# Patient Record
Sex: FEMALE | Race: WHITE | NOT HISPANIC OR LATINO | Employment: UNEMPLOYED | URBAN - METROPOLITAN AREA
[De-identification: names, ages, dates, MRNs, and addresses within clinical notes are randomized per-mention and may not be internally consistent; named-entity substitution may affect disease eponyms.]

---

## 2022-08-12 ENCOUNTER — OFFICE VISIT (OUTPATIENT)
Dept: FAMILY MEDICINE | Facility: CLINIC | Age: 27
End: 2022-08-12
Payer: MEDICAID

## 2022-08-12 VITALS
OXYGEN SATURATION: 99 % | DIASTOLIC BLOOD PRESSURE: 80 MMHG | HEART RATE: 86 BPM | SYSTOLIC BLOOD PRESSURE: 115 MMHG | TEMPERATURE: 97 F | RESPIRATION RATE: 18 BRPM | WEIGHT: 212 LBS | BODY MASS INDEX: 35.32 KG/M2 | HEIGHT: 65 IN

## 2022-08-12 DIAGNOSIS — Z76.89 ENCOUNTER TO ESTABLISH CARE: Primary | ICD-10-CM

## 2022-08-12 DIAGNOSIS — R10.11 RIGHT UPPER QUADRANT ABDOMINAL PAIN: ICD-10-CM

## 2022-08-12 DIAGNOSIS — R19.7 DIARRHEA, UNSPECIFIED TYPE: ICD-10-CM

## 2022-08-12 PROCEDURE — 1159F MED LIST DOCD IN RCRD: CPT | Mod: CPTII,,, | Performed by: REGISTERED NURSE

## 2022-08-12 PROCEDURE — 99202 PR OFFICE/OUTPT VISIT, NEW, LEVL II, 15-29 MIN: ICD-10-PCS | Mod: S$PBB,,, | Performed by: REGISTERED NURSE

## 2022-08-12 PROCEDURE — 99202 OFFICE O/P NEW SF 15 MIN: CPT | Mod: S$PBB,,, | Performed by: REGISTERED NURSE

## 2022-08-12 PROCEDURE — 3008F BODY MASS INDEX DOCD: CPT | Mod: CPTII,,, | Performed by: REGISTERED NURSE

## 2022-08-12 PROCEDURE — 3074F PR MOST RECENT SYSTOLIC BLOOD PRESSURE < 130 MM HG: ICD-10-PCS | Mod: CPTII,,, | Performed by: REGISTERED NURSE

## 2022-08-12 PROCEDURE — 3008F PR BODY MASS INDEX (BMI) DOCUMENTED: ICD-10-PCS | Mod: CPTII,,, | Performed by: REGISTERED NURSE

## 2022-08-12 PROCEDURE — 1159F PR MEDICATION LIST DOCUMENTED IN MEDICAL RECORD: ICD-10-PCS | Mod: CPTII,,, | Performed by: REGISTERED NURSE

## 2022-08-12 PROCEDURE — 3079F DIAST BP 80-89 MM HG: CPT | Mod: CPTII,,, | Performed by: REGISTERED NURSE

## 2022-08-12 PROCEDURE — 3074F SYST BP LT 130 MM HG: CPT | Mod: CPTII,,, | Performed by: REGISTERED NURSE

## 2022-08-12 PROCEDURE — 3079F PR MOST RECENT DIASTOLIC BLOOD PRESSURE 80-89 MM HG: ICD-10-PCS | Mod: CPTII,,, | Performed by: REGISTERED NURSE

## 2022-08-12 PROCEDURE — 99999 PR PBB SHADOW E&M-EST. PATIENT-LVL IV: ICD-10-PCS | Mod: PBBFAC,,, | Performed by: REGISTERED NURSE

## 2022-08-12 PROCEDURE — 99214 OFFICE O/P EST MOD 30 MIN: CPT | Mod: PBBFAC | Performed by: REGISTERED NURSE

## 2022-08-12 PROCEDURE — 99999 PR PBB SHADOW E&M-EST. PATIENT-LVL IV: CPT | Mod: PBBFAC,,, | Performed by: REGISTERED NURSE

## 2022-08-12 RX ORDER — FLUOXETINE 10 MG/1
10 CAPSULE ORAL DAILY
Status: ON HOLD | COMMUNITY
Start: 2022-07-30 | End: 2023-02-15

## 2022-08-12 RX ORDER — LITHIUM CARBONATE 300 MG/1
60 CAPSULE ORAL 2 TIMES DAILY
Status: ON HOLD | COMMUNITY
Start: 2022-07-30 | End: 2023-02-15 | Stop reason: HOSPADM

## 2022-08-12 RX ORDER — BENZTROPINE MESYLATE 1 MG/1
1 TABLET ORAL NIGHTLY
Status: ON HOLD | COMMUNITY
Start: 2022-07-30 | End: 2023-02-15 | Stop reason: HOSPADM

## 2022-08-12 RX ORDER — FLUTICASONE PROPIONATE 50 MCG
1 SPRAY, SUSPENSION (ML) NASAL DAILY
Status: ON HOLD | COMMUNITY
Start: 2022-07-30 | End: 2023-02-15

## 2022-08-12 RX ORDER — LORATADINE 10 MG/1
10 TABLET ORAL DAILY
Status: ON HOLD | COMMUNITY
Start: 2022-07-30 | End: 2023-02-15

## 2022-08-12 RX ORDER — PALIPERIDONE 6 MG/1
6 TABLET, EXTENDED RELEASE ORAL DAILY
Status: ON HOLD | COMMUNITY
Start: 2022-08-11 | End: 2023-02-15

## 2022-08-12 RX ORDER — TRAZODONE HYDROCHLORIDE 100 MG/1
100 TABLET ORAL NIGHTLY
Status: ON HOLD | COMMUNITY
Start: 2022-07-30 | End: 2023-02-15

## 2022-08-12 RX ORDER — FENOFIBRATE 134 MG/1
134 CAPSULE ORAL DAILY
Status: ON HOLD | COMMUNITY
Start: 2022-07-30 | End: 2023-02-15

## 2022-08-12 NOTE — PATIENT INSTRUCTIONS
healthy lifestyle discussed with patient.  Patient instructed to increase physical activity to 30 minutes most days as tolerated.  Medication compliance discussed with patient.  Keep next scheduled mental health appointment at [facility]  Abdominal ultrasound ordered today, will call patient with results.  Patient to return to clinic in 3 months for Pap and breast exam  .  Return to clinic as

## 2022-08-12 NOTE — PROGRESS NOTES
Subjective:       Patient ID: Brit Baird is a 27 y.o. female.    Chief Complaint: Est. care, wellness, abdominal pain    Patient is a 27-year-old female here today to establish care.  Patient has past medical history of schizoaffective disorder and PCOS.  Patient complaining of right upper quadrant pain x2 weeks that radiates to mid abdomen and diarrhea.    Review of Systems   Constitutional: Negative.  Negative for chills, fatigue and fever.   HENT: Negative.    Eyes: Negative.    Respiratory: Negative.  Negative for cough and shortness of breath.    Cardiovascular: Negative.  Negative for chest pain.   Gastrointestinal: Positive for abdominal pain and diarrhea.   Endocrine: Negative.    Genitourinary: Negative.    Musculoskeletal: Negative.    Integumentary:  Negative.   Allergic/Immunologic: Negative.    Neurological: Negative.    Hematological: Negative.    Psychiatric/Behavioral: Negative for self-injury and suicidal ideas.         Objective:      Physical Exam  HENT:      Head: Normocephalic.      Right Ear: Tympanic membrane normal.      Left Ear: Tympanic membrane normal.      Nose: Nose normal.      Mouth/Throat:      Mouth: Mucous membranes are moist.   Eyes:      Pupils: Pupils are equal, round, and reactive to light.   Cardiovascular:      Rate and Rhythm: Normal rate and regular rhythm.      Pulses: Normal pulses.      Heart sounds: Normal heart sounds.   Pulmonary:      Effort: Pulmonary effort is normal.      Breath sounds: Normal breath sounds.   Abdominal:      General: Abdomen is flat. Bowel sounds are normal.      Tenderness: There is abdominal tenderness in the right upper quadrant and epigastric area. There is no guarding or rebound. Negative signs include Coates's sign, Rovsing's sign, McBurney's sign and psoas sign.   Musculoskeletal:         General: Normal range of motion.      Cervical back: Normal range of motion.   Skin:     General: Skin is warm and dry.      Capillary Refill: Capillary  refill takes less than 2 seconds.   Neurological:      General: No focal deficit present.      Mental Status: She is alert and oriented to person, place, and time.   Psychiatric:         Mood and Affect: Mood normal.         Behavior: Behavior normal.         Thought Content: Thought content normal.         Judgment: Judgment normal.         Assessment:       Problem List Items Addressed This Visit    None     Visit Diagnoses     Encounter to establish care    -  Primary    Right upper quadrant abdominal pain        Diarrhea, unspecified type              Plan:     healthy lifestyle discussed with patient.  Patient instructed to increase physical activity to 30 minutes most days as tolerated.  Medication compliance discussed with patient.  Keep next scheduled mental health appointment at [facility]  Abdominal ultrasound ordered today, will call patient with results.  Patient to return to clinic in 3 months for Pap and breast exam  .  Return to clinic as needed

## 2022-09-27 DIAGNOSIS — N92.6 IRREGULAR PERIODS: Primary | ICD-10-CM

## 2022-10-07 ENCOUNTER — HOSPITAL ENCOUNTER (OUTPATIENT)
Dept: RADIOLOGY | Facility: HOSPITAL | Age: 27
Discharge: HOME | End: 2022-10-07
Attending: NURSE PRACTITIONER
Payer: MEDICAID

## 2022-10-07 DIAGNOSIS — N92.6 IRREGULAR PERIODS: ICD-10-CM

## 2022-10-07 PROCEDURE — 76856 US EXAM PELVIC COMPLETE: CPT | Mod: TC

## 2022-10-10 ENCOUNTER — TELEPHONE (OUTPATIENT)
Dept: FAMILY MEDICINE | Facility: CLINIC | Age: 27
End: 2022-10-10
Payer: MEDICAID

## 2022-10-10 NOTE — TELEPHONE ENCOUNTER
----- Message from CHANNING Barragan sent at 10/7/2022 10:43 AM CDT -----  Can you let her know that her ultrasound was normal, thanks.

## 2022-11-18 ENCOUNTER — OFFICE VISIT (OUTPATIENT)
Dept: FAMILY MEDICINE | Facility: CLINIC | Age: 27
End: 2022-11-18
Payer: MEDICAID

## 2022-11-18 VITALS
DIASTOLIC BLOOD PRESSURE: 82 MMHG | HEART RATE: 99 BPM | BODY MASS INDEX: 37.09 KG/M2 | OXYGEN SATURATION: 97 % | RESPIRATION RATE: 16 BRPM | SYSTOLIC BLOOD PRESSURE: 122 MMHG | HEIGHT: 65 IN | WEIGHT: 222.63 LBS

## 2022-11-18 DIAGNOSIS — F31.9 BIPOLAR 1 DISORDER: ICD-10-CM

## 2022-11-18 DIAGNOSIS — Z71.89 ENCOUNTER FOR BREAST SELF EXAMINATION EDUCATION: ICD-10-CM

## 2022-11-18 DIAGNOSIS — Z01.419 ENCOUNTER FOR CERVICAL PAP SMEAR WITH PELVIC EXAM: ICD-10-CM

## 2022-11-18 DIAGNOSIS — N92.6 MENSTRUAL IRREGULARITY: ICD-10-CM

## 2022-11-18 DIAGNOSIS — Z87.42 HISTORY OF PCOS: Primary | ICD-10-CM

## 2022-11-18 DIAGNOSIS — E66.9 OBESITY (BMI 35.0-39.9 WITHOUT COMORBIDITY): ICD-10-CM

## 2022-11-18 PROCEDURE — 99215 PR OFFICE/OUTPT VISIT, EST, LEVL V, 40-54 MIN: ICD-10-PCS | Mod: S$PBB,,, | Performed by: REGISTERED NURSE

## 2022-11-18 PROCEDURE — 3008F BODY MASS INDEX DOCD: CPT | Mod: CPTII,,, | Performed by: REGISTERED NURSE

## 2022-11-18 PROCEDURE — 99999 PR PBB SHADOW E&M-EST. PATIENT-LVL IV: CPT | Mod: PBBFAC,,, | Performed by: REGISTERED NURSE

## 2022-11-18 PROCEDURE — 3079F PR MOST RECENT DIASTOLIC BLOOD PRESSURE 80-89 MM HG: ICD-10-PCS | Mod: CPTII,,, | Performed by: REGISTERED NURSE

## 2022-11-18 PROCEDURE — 3074F SYST BP LT 130 MM HG: CPT | Mod: CPTII,,, | Performed by: REGISTERED NURSE

## 2022-11-18 PROCEDURE — 1159F PR MEDICATION LIST DOCUMENTED IN MEDICAL RECORD: ICD-10-PCS | Mod: CPTII,,, | Performed by: REGISTERED NURSE

## 2022-11-18 PROCEDURE — 99214 OFFICE O/P EST MOD 30 MIN: CPT | Mod: PBBFAC | Performed by: REGISTERED NURSE

## 2022-11-18 PROCEDURE — 3008F PR BODY MASS INDEX (BMI) DOCUMENTED: ICD-10-PCS | Mod: CPTII,,, | Performed by: REGISTERED NURSE

## 2022-11-18 PROCEDURE — 1159F MED LIST DOCD IN RCRD: CPT | Mod: CPTII,,, | Performed by: REGISTERED NURSE

## 2022-11-18 PROCEDURE — 99215 OFFICE O/P EST HI 40 MIN: CPT | Mod: S$PBB,,, | Performed by: REGISTERED NURSE

## 2022-11-18 PROCEDURE — 3079F DIAST BP 80-89 MM HG: CPT | Mod: CPTII,,, | Performed by: REGISTERED NURSE

## 2022-11-18 PROCEDURE — 99999 PR PBB SHADOW E&M-EST. PATIENT-LVL IV: ICD-10-PCS | Mod: PBBFAC,,, | Performed by: REGISTERED NURSE

## 2022-11-18 PROCEDURE — 3074F PR MOST RECENT SYSTOLIC BLOOD PRESSURE < 130 MM HG: ICD-10-PCS | Mod: CPTII,,, | Performed by: REGISTERED NURSE

## 2022-11-18 RX ORDER — NORGESTIMATE AND ETHINYL ESTRADIOL 7DAYSX3 LO
1 KIT ORAL DAILY
Qty: 30 TABLET | Refills: 11 | Status: ON HOLD | OUTPATIENT
Start: 2022-11-18 | End: 2023-02-15

## 2022-11-18 RX ORDER — ERGOCALCIFEROL 1.25 MG/1
CAPSULE ORAL
Status: ON HOLD | COMMUNITY
Start: 2022-09-27 | End: 2023-02-15

## 2022-11-18 NOTE — PROGRESS NOTES
Subjective:       Patient ID: Brit Baird is a 27 y.o. female.    Chief Complaint: Follow-up (3 month f/u (Pelvic Exam)/Had period 1 month ago/Concerned due to lack of periods/ periods not starting on their own/Prescribed medication to start periods)    Patient is here for Pap and breast exam today.  Patient complaining of menstrual irregularity, patient states last menstrual cycle was over 1 month ago, UPT in office was negative.  Patient stated that she was diagnosed with PCOS, and would like to start birth control today.  Review of Systems   Constitutional: Negative.  Negative for chills, fatigue and fever.   HENT: Negative.     Eyes: Negative.    Respiratory: Negative.     Cardiovascular: Negative.    Gastrointestinal: Negative.    Endocrine: Negative.    Genitourinary:  Positive for menstrual irregularity. Negative for pelvic pain, vaginal discharge and vaginal pain.   Musculoskeletal: Negative.    Integumentary:  Negative for breast mass and breast tenderness. Negative.   Allergic/Immunologic: Negative.    Neurological: Negative.    Hematological: Negative.    Psychiatric/Behavioral: Negative.          Bipolar disorder   Breast: Negative for mass and tenderness      Objective:      Physical Exam  Exam conducted with a chaperone present.   Constitutional:       Appearance: Normal appearance. She is obese.   Cardiovascular:      Rate and Rhythm: Normal rate and regular rhythm.      Pulses: Normal pulses.      Heart sounds: Normal heart sounds.   Chest:   Breasts:     Breasts are symmetrical.      Right: Normal.      Left: Normal.      Comments: Monthly breast self exams discussed with patient.  Genitourinary:     General: Normal vulva.      Vagina: Normal.      Cervix: Normal.      Uterus: Normal.       Adnexa: Right adnexa normal and left adnexa normal.      Comments: Pap collected, pt tolerated well.   Lymphadenopathy:      Upper Body:      Right upper body: No supraclavicular, axillary or pectoral adenopathy.       Left upper body: No supraclavicular, axillary or pectoral adenopathy.   Neurological:      Mental Status: She is alert.       Assessment:       Problem List Items Addressed This Visit          Psychiatric    Bipolar 1 disorder       Renal/    History of PCOS - Primary    Encounter for breast self examination education       Endocrine    Obesity (BMI 35.0-39.9 without comorbidity)     Other Visit Diagnoses       Encounter for cervical Pap smear with pelvic exam        Relevant Orders    Liquid-based pap smear, screening    HPV High Risk Genotypes, PCR    Liquid-Based Pap Smear, Screening Screening    Menstrual irregularity        Relevant Medications    norgestimate-ethinyl estradioL (ORTHO TRI-CYCLEN LO) 0.18/0.215/0.25 mg-25 mcg tablet    Other Relevant Orders    POCT Urine Pregnancy        I spent a total of 60 minutes on the day of the visit.This includes face to face time and non-face to face time preparing to see the patient (eg, review of tests), obtaining and/or reviewing separately obtained history, documenting clinical information in the electronic or other health record, independently interpreting results and communicating results to the patient/family/caregiver, or care coordinator.    Plan:   Pap smear/pelvic exam-Pap collected today, will call patient with results.    Menstrual irregularity-ortho Tri-Cyclen sent to pharmacy on file with instructions, patient instructed to start the pill today.  Use backup method x7 days at the start of pill if sexually active.     Breast self-exam-patient educated on proper technique of breast self-exam, instructed to examine breasts at least 7 days after menstrual cycle monthly, report any abnormalities    Obesity-patient instructed to increase physical activity to 30 minutes most days as tolerated, benefits of weight loss discussed with patient.  Low calorie diet discussed.    Return to clinic in 3 months for follow-up exam

## 2022-11-29 LAB
INSULIN SERPL-ACNC: NORMAL U[IU]/ML
LAB AP BETHESDA CATEGORY: NORMAL
LAB AP CLINICAL FINDINGS: NORMAL
LAB AP CONTRACEPTIVES: NORMAL
LAB AP LMP DATE: NORMAL
LAB AP OCHS PAP SPECIMEN ADEQUACY: NORMAL
LAB AP OHS PAP INTERPRETATION: NORMAL
LAB AP PAP DISCLAIMER COMMENTS: NORMAL
LAB AP PAP ESTROGEN REPLACEMENT THERAPY: NORMAL
LAB AP PAP PMP: NORMAL
LAB AP PAP PREVIOUS BX: NORMAL
LAB AP PAP PRIOR TREATMENT: NORMAL

## 2023-02-09 ENCOUNTER — HOSPITAL ENCOUNTER (EMERGENCY)
Facility: HOSPITAL | Age: 28
Discharge: PSYCHIATRIC OR SUBSTANCE USE FACILITY | End: 2023-02-10
Attending: GENERAL ACUTE CARE HOSPITAL
Payer: MEDICAID

## 2023-02-09 DIAGNOSIS — R31.9 URINARY TRACT INFECTION WITH HEMATURIA, SITE UNSPECIFIED: ICD-10-CM

## 2023-02-09 DIAGNOSIS — Z00.8 MEDICAL CLEARANCE FOR PSYCHIATRIC ADMISSION: ICD-10-CM

## 2023-02-09 DIAGNOSIS — N39.0 URINARY TRACT INFECTION WITH HEMATURIA, SITE UNSPECIFIED: ICD-10-CM

## 2023-02-09 DIAGNOSIS — F11.10 FENTANYL USE DISORDER, MILD, ABUSE: ICD-10-CM

## 2023-02-09 DIAGNOSIS — F20.0 PARANOID SCHIZOPHRENIA: Primary | ICD-10-CM

## 2023-02-09 LAB
ALBUMIN SERPL-MCNC: 4.3 G/DL (ref 3.5–5)
ALBUMIN/GLOB SERPL: 1.5 RATIO (ref 1.1–2)
ALP SERPL-CCNC: 37 UNIT/L (ref 40–150)
ALT SERPL-CCNC: 26 UNIT/L (ref 0–55)
AMPHET UR QL SCN: NEGATIVE
APAP SERPL-MCNC: <17.4 UG/ML (ref 17.4–30)
APPEARANCE UR: ABNORMAL
AST SERPL-CCNC: 19 UNIT/L (ref 5–34)
B-HCG SERPL QL: NEGATIVE
BACTERIA #/AREA URNS AUTO: ABNORMAL /HPF
BARBITURATE SCN PRESENT UR: NEGATIVE
BASOPHILS # BLD AUTO: 0.05 X10(3)/MCL (ref 0–0.2)
BASOPHILS NFR BLD AUTO: 0.8 %
BENZODIAZ UR QL SCN: NEGATIVE
BILIRUB UR QL STRIP.AUTO: NEGATIVE MG/DL
BILIRUBIN DIRECT+TOT PNL SERPL-MCNC: 0.2 MG/DL
BUN SERPL-MCNC: 12 MG/DL (ref 7–18.7)
CALCIUM SERPL-MCNC: 9 MG/DL (ref 8.4–10.2)
CANNABINOIDS UR QL SCN: NEGATIVE
CHLORIDE SERPL-SCNC: 110 MMOL/L (ref 98–107)
CO2 SERPL-SCNC: 22 MMOL/L (ref 22–29)
COCAINE UR QL SCN: NEGATIVE
COLOR UR AUTO: ABNORMAL
CREAT SERPL-MCNC: 0.87 MG/DL (ref 0.55–1.02)
EOSINOPHIL # BLD AUTO: 0.09 X10(3)/MCL (ref 0–0.9)
EOSINOPHIL NFR BLD AUTO: 1.5 %
ERYTHROCYTE [DISTWIDTH] IN BLOOD BY AUTOMATED COUNT: 11.9 % (ref 11.5–17)
ETHANOL SERPL-MCNC: <10 MG/DL
FENTANYL UR QL SCN: POSITIVE
FLUAV AG UPPER RESP QL IA.RAPID: NOT DETECTED
FLUBV AG UPPER RESP QL IA.RAPID: NOT DETECTED
GFR SERPLBLD CREATININE-BSD FMLA CKD-EPI: >60 MLS/MIN/1.73/M2
GLOBULIN SER-MCNC: 2.9 GM/DL (ref 2.4–3.5)
GLUCOSE SERPL-MCNC: 105 MG/DL (ref 74–100)
GLUCOSE UR QL STRIP.AUTO: NEGATIVE MG/DL
HCT VFR BLD AUTO: 42 % (ref 37–47)
HGB BLD-MCNC: 14.2 GM/DL (ref 12–16)
IMM GRANULOCYTES # BLD AUTO: 0.02 X10(3)/MCL (ref 0–0.04)
IMM GRANULOCYTES NFR BLD AUTO: 0.3 %
KETONES UR QL STRIP.AUTO: NEGATIVE MG/DL
LEUKOCYTE ESTERASE UR QL STRIP.AUTO: ABNORMAL UNIT/L
LYMPHOCYTES # BLD AUTO: 2 X10(3)/MCL (ref 0.6–4.6)
LYMPHOCYTES NFR BLD AUTO: 32.3 %
MCH RBC QN AUTO: 29.7 PG
MCHC RBC AUTO-ENTMCNC: 33.8 MG/DL (ref 33–36)
MCV RBC AUTO: 87.9 FL (ref 80–94)
MDMA UR QL SCN: NEGATIVE
MONOCYTES # BLD AUTO: 0.43 X10(3)/MCL (ref 0.1–1.3)
MONOCYTES NFR BLD AUTO: 6.9 %
NEUTROPHILS # BLD AUTO: 3.61 X10(3)/MCL (ref 2.1–9.2)
NEUTROPHILS NFR BLD AUTO: 58.2 %
NITRITE UR QL STRIP.AUTO: NEGATIVE
OPIATES UR QL SCN: NEGATIVE
PCP UR QL: NEGATIVE
PH UR STRIP.AUTO: 7.5 [PH]
PH UR: 7.5 [PH] (ref 3–11)
PLATELET # BLD AUTO: 216 X10(3)/MCL (ref 130–400)
PMV BLD AUTO: 9.7 FL (ref 7.4–10.4)
POTASSIUM SERPL-SCNC: 3.9 MMOL/L (ref 3.5–5.1)
PROT SERPL-MCNC: 7.2 GM/DL (ref 6.4–8.3)
PROT UR QL STRIP.AUTO: ABNORMAL MG/DL
RBC # BLD AUTO: 4.78 X10(6)/MCL (ref 4.2–5.4)
RBC #/AREA URNS AUTO: ABNORMAL /HPF
RBC UR QL AUTO: NEGATIVE UNIT/L
SALICYLATES SERPL-MCNC: <5 MG/DL
SARS-COV-2 RNA RESP QL NAA+PROBE: NOT DETECTED
SODIUM SERPL-SCNC: 140 MMOL/L (ref 136–145)
SP GR UR STRIP.AUTO: 1.02
SPECIFIC GRAVITY, URINE AUTO (.000) (OHS): 1.02 (ref 1–1.03)
SQUAMOUS #/AREA URNS AUTO: ABNORMAL /HPF
TSH SERPL-ACNC: 1.22 UIU/ML (ref 0.35–4.94)
UROBILINOGEN UR STRIP-ACNC: 0.2 MG/DL
WBC # SPEC AUTO: 6.2 X10(3)/MCL (ref 4.5–11.5)
WBC #/AREA URNS AUTO: ABNORMAL /HPF

## 2023-02-09 PROCEDURE — 80143 DRUG ASSAY ACETAMINOPHEN: CPT | Performed by: NURSE PRACTITIONER

## 2023-02-09 PROCEDURE — 99285 EMERGENCY DEPT VISIT HI MDM: CPT

## 2023-02-09 PROCEDURE — 85025 COMPLETE CBC W/AUTO DIFF WBC: CPT | Performed by: NURSE PRACTITIONER

## 2023-02-09 PROCEDURE — 84443 ASSAY THYROID STIM HORMONE: CPT | Performed by: NURSE PRACTITIONER

## 2023-02-09 PROCEDURE — 80307 DRUG TEST PRSMV CHEM ANLYZR: CPT | Performed by: NURSE PRACTITIONER

## 2023-02-09 PROCEDURE — 81001 URINALYSIS AUTO W/SCOPE: CPT | Performed by: NURSE PRACTITIONER

## 2023-02-09 PROCEDURE — 80179 DRUG ASSAY SALICYLATE: CPT | Performed by: NURSE PRACTITIONER

## 2023-02-09 PROCEDURE — 25000003 PHARM REV CODE 250: Performed by: GENERAL ACUTE CARE HOSPITAL

## 2023-02-09 PROCEDURE — 82077 ASSAY SPEC XCP UR&BREATH IA: CPT | Performed by: NURSE PRACTITIONER

## 2023-02-09 PROCEDURE — 80053 COMPREHEN METABOLIC PANEL: CPT | Performed by: NURSE PRACTITIONER

## 2023-02-09 PROCEDURE — 81025 URINE PREGNANCY TEST: CPT | Performed by: GENERAL ACUTE CARE HOSPITAL

## 2023-02-09 PROCEDURE — 0240U COVID/FLU A&B PCR: CPT | Performed by: GENERAL ACUTE CARE HOSPITAL

## 2023-02-09 RX ORDER — PALIPERIDONE PALMITATE 546 MG/1.75ML
INJECTION, SUSPENSION, EXTENDED RELEASE INTRAMUSCULAR
Status: ON HOLD | COMMUNITY
Start: 2022-12-14 | End: 2023-02-15

## 2023-02-09 RX ORDER — CEPHALEXIN 500 MG/1
500 TABLET ORAL 3 TIMES DAILY
Qty: 21 TABLET | Refills: 0 | Status: ON HOLD | OUTPATIENT
Start: 2023-02-09 | End: 2023-02-15 | Stop reason: HOSPADM

## 2023-02-09 RX ORDER — CEPHALEXIN 250 MG/1
500 CAPSULE ORAL
Status: COMPLETED | OUTPATIENT
Start: 2023-02-09 | End: 2023-02-09

## 2023-02-09 RX ORDER — CALCIUM CARBONATE 200(500)MG
500 TABLET,CHEWABLE ORAL
Status: COMPLETED | OUTPATIENT
Start: 2023-02-09 | End: 2023-02-09

## 2023-02-09 RX ORDER — LITHIUM CARBONATE 150 MG/1
150 CAPSULE ORAL 2 TIMES DAILY
Status: ON HOLD | COMMUNITY
Start: 2023-02-06 | End: 2023-02-15 | Stop reason: HOSPADM

## 2023-02-09 RX ADMIN — CEPHALEXIN 500 MG: 250 CAPSULE ORAL at 08:02

## 2023-02-09 RX ADMIN — CALCIUM CARBONATE (ANTACID) CHEW TAB 500 MG 500 MG: 500 CHEW TAB at 09:02

## 2023-02-10 ENCOUNTER — HOSPITAL ENCOUNTER (INPATIENT)
Facility: HOSPITAL | Age: 28
LOS: 6 days | Discharge: HOME | DRG: 885 | End: 2023-02-16
Attending: PSYCHIATRY & NEUROLOGY | Admitting: PSYCHIATRY & NEUROLOGY
Payer: MEDICAID

## 2023-02-10 VITALS
WEIGHT: 205 LBS | TEMPERATURE: 98 F | SYSTOLIC BLOOD PRESSURE: 129 MMHG | HEART RATE: 87 BPM | RESPIRATION RATE: 18 BRPM | OXYGEN SATURATION: 100 % | HEIGHT: 66 IN | BODY MASS INDEX: 32.95 KG/M2 | DIASTOLIC BLOOD PRESSURE: 72 MMHG

## 2023-02-10 DIAGNOSIS — F29 PSYCHOSIS: ICD-10-CM

## 2023-02-10 LAB
ALBUMIN SERPL-MCNC: 4.1 G/DL (ref 3.5–5)
ALBUMIN/GLOB SERPL: 1.6 RATIO (ref 1.1–2)
ALP SERPL-CCNC: 28 UNIT/L (ref 40–150)
ALT SERPL-CCNC: 24 UNIT/L (ref 0–55)
AST SERPL-CCNC: 19 UNIT/L (ref 5–34)
BILIRUBIN DIRECT+TOT PNL SERPL-MCNC: 0.2 MG/DL
BUN SERPL-MCNC: 10.2 MG/DL (ref 7–18.7)
CALCIUM SERPL-MCNC: 9.6 MG/DL (ref 8.4–10.2)
CHLORIDE SERPL-SCNC: 110 MMOL/L (ref 98–107)
CHOLEST SERPL-MCNC: 147 MG/DL
CHOLEST/HDLC SERPL: 5 {RATIO} (ref 0–5)
CO2 SERPL-SCNC: 23 MMOL/L (ref 22–29)
CREAT SERPL-MCNC: 0.76 MG/DL (ref 0.55–1.02)
EST. AVERAGE GLUCOSE BLD GHB EST-MCNC: 96.8 MG/DL
GFR SERPLBLD CREATININE-BSD FMLA CKD-EPI: >60 MLS/MIN/1.73/M2
GLOBULIN SER-MCNC: 2.6 GM/DL (ref 2.4–3.5)
GLUCOSE SERPL-MCNC: 111 MG/DL (ref 74–100)
HBA1C MFR BLD: 5 %
HDLC SERPL-MCNC: 29 MG/DL (ref 35–60)
LDLC SERPL CALC-MCNC: 59 MG/DL (ref 50–140)
POTASSIUM SERPL-SCNC: 4 MMOL/L (ref 3.5–5.1)
PROT SERPL-MCNC: 6.7 GM/DL (ref 6.4–8.3)
SODIUM SERPL-SCNC: 142 MMOL/L (ref 136–145)
T PALLIDUM AB SER QL: NONREACTIVE
TRIGL SERPL-MCNC: 293 MG/DL (ref 37–140)
TSH SERPL-ACNC: 2.36 UIU/ML (ref 0.35–4.94)
VLDLC SERPL CALC-MCNC: 59 MG/DL

## 2023-02-10 PROCEDURE — 84443 ASSAY THYROID STIM HORMONE: CPT | Performed by: PSYCHIATRY & NEUROLOGY

## 2023-02-10 PROCEDURE — 12400001 HC PSYCH SEMI-PRIVATE ROOM

## 2023-02-10 PROCEDURE — 80053 COMPREHEN METABOLIC PANEL: CPT | Performed by: PSYCHIATRY & NEUROLOGY

## 2023-02-10 PROCEDURE — 80061 LIPID PANEL: CPT | Performed by: PSYCHIATRY & NEUROLOGY

## 2023-02-10 PROCEDURE — 25000003 PHARM REV CODE 250: Performed by: PSYCHIATRY & NEUROLOGY

## 2023-02-10 PROCEDURE — 25000003 PHARM REV CODE 250: Performed by: GENERAL ACUTE CARE HOSPITAL

## 2023-02-10 PROCEDURE — 86780 TREPONEMA PALLIDUM: CPT | Performed by: PSYCHIATRY & NEUROLOGY

## 2023-02-10 PROCEDURE — 83036 HEMOGLOBIN GLYCOSYLATED A1C: CPT | Performed by: PSYCHIATRY & NEUROLOGY

## 2023-02-10 PROCEDURE — 80178 ASSAY OF LITHIUM: CPT

## 2023-02-10 PROCEDURE — 25000003 PHARM REV CODE 250

## 2023-02-10 RX ORDER — LORAZEPAM 2 MG/ML
2 INJECTION INTRAMUSCULAR EVERY 4 HOURS PRN
Status: DISCONTINUED | OUTPATIENT
Start: 2023-02-10 | End: 2023-02-16 | Stop reason: HOSPADM

## 2023-02-10 RX ORDER — HYDROXYZINE HYDROCHLORIDE 50 MG/1
50 TABLET, FILM COATED ORAL EVERY 4 HOURS PRN
Status: DISCONTINUED | OUTPATIENT
Start: 2023-02-10 | End: 2023-02-16 | Stop reason: HOSPADM

## 2023-02-10 RX ORDER — MAG HYDROX/ALUMINUM HYD/SIMETH 200-200-20
30 SUSPENSION, ORAL (FINAL DOSE FORM) ORAL EVERY 6 HOURS PRN
Status: DISCONTINUED | OUTPATIENT
Start: 2023-02-10 | End: 2023-02-16 | Stop reason: HOSPADM

## 2023-02-10 RX ORDER — CEPHALEXIN 500 MG/1
500 CAPSULE ORAL EVERY 8 HOURS
Status: DISCONTINUED | OUTPATIENT
Start: 2023-02-10 | End: 2023-02-16 | Stop reason: HOSPADM

## 2023-02-10 RX ORDER — TRAZODONE HYDROCHLORIDE 100 MG/1
100 TABLET ORAL NIGHTLY PRN
Status: DISCONTINUED | OUTPATIENT
Start: 2023-02-10 | End: 2023-02-13

## 2023-02-10 RX ORDER — ONDANSETRON 4 MG/1
4 TABLET, ORALLY DISINTEGRATING ORAL EVERY 8 HOURS PRN
Status: DISCONTINUED | OUTPATIENT
Start: 2023-02-10 | End: 2023-02-16 | Stop reason: HOSPADM

## 2023-02-10 RX ORDER — BENZTROPINE MESYLATE 1 MG/1
1 TABLET ORAL DAILY
Status: DISCONTINUED | OUTPATIENT
Start: 2023-02-10 | End: 2023-02-16 | Stop reason: HOSPADM

## 2023-02-10 RX ORDER — ACETAMINOPHEN 325 MG/1
650 TABLET ORAL EVERY 6 HOURS PRN
Status: DISCONTINUED | OUTPATIENT
Start: 2023-02-10 | End: 2023-02-16 | Stop reason: HOSPADM

## 2023-02-10 RX ORDER — IBUPROFEN 200 MG
1 TABLET ORAL DAILY
Status: DISCONTINUED | OUTPATIENT
Start: 2023-02-10 | End: 2023-02-14

## 2023-02-10 RX ORDER — DIPHENHYDRAMINE HYDROCHLORIDE 50 MG/ML
50 INJECTION INTRAMUSCULAR; INTRAVENOUS EVERY 4 HOURS PRN
Status: DISCONTINUED | OUTPATIENT
Start: 2023-02-10 | End: 2023-02-16 | Stop reason: HOSPADM

## 2023-02-10 RX ORDER — PROMETHAZINE HYDROCHLORIDE 25 MG/1
25 TABLET ORAL EVERY 6 HOURS PRN
Status: DISCONTINUED | OUTPATIENT
Start: 2023-02-10 | End: 2023-02-16 | Stop reason: HOSPADM

## 2023-02-10 RX ORDER — LITHIUM CARBONATE 150 MG/1
150 CAPSULE ORAL 2 TIMES DAILY
Status: DISCONTINUED | OUTPATIENT
Start: 2023-02-10 | End: 2023-02-13

## 2023-02-10 RX ORDER — HALOPERIDOL 5 MG/ML
10 INJECTION INTRAMUSCULAR EVERY 4 HOURS PRN
Status: DISCONTINUED | OUTPATIENT
Start: 2023-02-10 | End: 2023-02-16 | Stop reason: HOSPADM

## 2023-02-10 RX ORDER — ADHESIVE BANDAGE
30 BANDAGE TOPICAL DAILY PRN
Status: DISCONTINUED | OUTPATIENT
Start: 2023-02-10 | End: 2023-02-16 | Stop reason: HOSPADM

## 2023-02-10 RX ADMIN — LITHIUM CARBONATE 150 MG: 150 CAPSULE, GELATIN COATED ORAL at 08:02

## 2023-02-10 RX ADMIN — BENZTROPINE MESYLATE 1 MG: 1 TABLET ORAL at 12:02

## 2023-02-10 RX ADMIN — CEPHALEXIN 500 MG: 500 CAPSULE ORAL at 09:02

## 2023-02-10 RX ADMIN — LITHIUM CARBONATE 150 MG: 150 CAPSULE, GELATIN COATED ORAL at 12:02

## 2023-02-10 RX ADMIN — CEPHALEXIN 500 MG: 500 CAPSULE ORAL at 02:02

## 2023-02-10 RX ADMIN — TRAZODONE HYDROCHLORIDE 100 MG: 100 TABLET ORAL at 09:02

## 2023-02-10 NOTE — PLAN OF CARE
Psychosocial Assessment     Pt is a 27 y.o. YO  female admitted due to harjinder. Pt UDS was Positive for fentanyl.  Pt ETOH < 10. Pt denies  SI, HI, and AVH at this time. Pt last inpatient  was a hear ago . Pt presents Cooperative with CM staff 1:1. Pt originally from [place], [place]  . Pt has  0 dependents. Pt  is Single .  Pt completed High school. Pt denies  service.  Pt denies financial issues. Pt disabled.  Pt works at  [employer]. Pt denies legal issues. Pt states they do  receive comfort from spiritual practices. Pt emergency contact is Mother. Sabrina  Their phone number is  [phone] . Pt discharge plan at this time is home. 104 1/2 [facility] NIKKI; Maria Fernanda Hamm    02/10/23 1206   Initial Information   Source of Information patient   Reason for Admission harjinder   Patient Aware of Diagnosis yes   Arrived From emergency department   Current or Previous  Service none   Spiritual Beliefs   Spiritual, Cultural Beliefs, Ep[employer] Practices, Values that Affect Care yes   Substance Use/Withdrawal   Substance Use Current, used prior to admission;Denies use   Additional Tobacco Use   How many cigarettes do you typically have per day? 0   AUDIT-C (Alcohol Use Disorders ID Test)   Alcohol Use In Past Year 0-->never   Alcohol Amount Per Day In Past Year 0-->none   More Than 6 Drinks On One Occasion In Past Year 0-->never   Total Audit C Score 0   Transition Planning   Patient/Family Anticipates Transition to home

## 2023-02-10 NOTE — ED PROVIDER NOTES
Encounter Date: 2/9/2023       History     Chief Complaint   Patient presents with    Psychiatric Evaluation     Patient states she got in a argument with her father a few days ago and started to have anger problems. Patient states she is on a shot for schizoaffective disorder. Patient denies HI/SI at this time       Patient was brought emergency room by mother with chief complaints on the anger bipolar mood, auditory hallucinations, patient has history of schizoaffective disorder, last inpatient psychiatrist evaluation was 1 ago, patient was recently started on monthly injections of Invega, December 16, 2022 patient had Invega injection which supposed to last for 3 months, 10 days ago mother noted patient to be very angry, through his cell phone in the mother, did not sleep for 3 days    Review of patient's allergies indicates:   Allergen Reactions    Ibuprofen      Past Medical History:   Diagnosis Date    Schizo affective schizophrenia      No past surgical history on file.  Family History   Problem Relation Age of Onset    Heart disease Father      Social History     Tobacco Use    Smoking status: Never    Smokeless tobacco: Never   Substance Use Topics    Alcohol use: Never    Drug use: Never     Review of Systems   Psychiatric/Behavioral:  Positive for agitation, behavioral problems, hallucinations and sleep disturbance.    All other systems reviewed and are negative.    Physical Exam     Initial Vitals [02/09/23 1909]   BP Pulse Resp Temp SpO2   (!) 147/90 89 20 98.1 °F (36.7 °C) 98 %      MAP       --         Physical Exam    Nursing note and vitals reviewed.  Constitutional: She appears well-developed and well-nourished.   HENT:   Right Ear: External ear normal.   Left Ear: External ear normal.   Eyes: Conjunctivae are normal. Pupils are equal, round, and reactive to light.   Neck: Neck supple.   Normal range of motion.  Cardiovascular:  Normal rate and regular rhythm.           Pulmonary/Chest:  Breath sounds normal.   Abdominal: Abdomen is soft. Bowel sounds are normal.   Musculoskeletal:         General: Normal range of motion.      Cervical back: Normal range of motion and neck supple.     Neurological: She is alert and oriented to person, place, and time. She has normal reflexes. GCS score is 15. GCS eye subscore is 4. GCS verbal subscore is 5. GCS motor subscore is 6.   Skin: Skin is warm. Capillary refill takes 2 to 3 seconds.   Psychiatric: Her mood appears anxious. She is hyperactive. Thought content is paranoid. Cognition and memory are normal.       ED Course   Procedures  Labs Reviewed   COMPREHENSIVE METABOLIC PANEL - Abnormal; Notable for the following components:       Result Value    Chloride 110 (*)     Glucose Level 105 (*)     Alkaline Phosphatase 37 (*)     All other components within normal limits   URINALYSIS, REFLEX TO URINE CULTURE - Abnormal; Notable for the following components:    Appearance, UA Cloudy (*)     Protein, UA Trace (*)     Leukocyte Esterase, UA Small (*)     All other components within normal limits   DRUG SCREEN, URINE (BEAKER) - Abnormal; Notable for the following components:    Fentanyl, Urine Positive (*)     All other components within normal limits    Narrative:     Cut off concentrations:    Amphetamines - 1000 ng/ml  Barbiturates - 200 ng/ml  Benzodiazepine - 200 ng/ml  Cannabinoids (THC) - 50 ng/ml  Cocaine - 300 ng/ml  Fentanyl - 1.0 ng/ml  MDMA - 500 ng/ml  Opiates - 300 ng/ml   Phencyclidine (PCP) - 25 ng/ml    Specimen submitted for drug analysis and tested for pH and specific gravity in order to evaluate sample integrity. Suspect tampering if specific gravity is <1.003 and/or pH is not within the range of 4.5 - 8.0  False negatives may result form substances such as bleach added to urine.  False positives may result for the presence of a substance with similar chemical structure to the drug or its metabolite.    This test provides only a PRELIMINARY  analytical test result. A more specific alternate chemical method must be used in order to obtain a confirmed analytical result. Gas chromatography/mass spectrometry (GC/MS) is the preferred confirmatory method. Other chemical confirmation methods are available. Clinical consideration and professional judgement should be applied to any drug of abuse test result, particularly when preliminary positive results are used.    Positive results will be confirmed only at the physicians request. Unconfirmed screening results are to be used only for medical purposes (treatment).        ACETAMINOPHEN LEVEL - Abnormal; Notable for the following components:    Acetaminophen Level <17.4 (*)     All other components within normal limits   URINALYSIS, MICROSCOPIC - Abnormal; Notable for the following components:    Bacteria, UA Few (*)     WBC, UA 6-10 (*)     Squamous Epithelial Cells, UA Few (*)     All other components within normal limits   TSH - Normal   ALCOHOL,MEDICAL (ETHANOL) - Normal   COVID/FLU A&B PCR - Normal    Narrative:     The Xpert Xpress SARS-CoV-2/FLU/RSV plus is a rapid, multiplexed real-time PCR test intended for the simultaneous qualitative detection and differentiation of SARS-CoV-2, Influenza A, Influenza B, and respiratory syncytial virus (RSV) viral RNA in either nasopharyngeal swab or nasal swab specimens.         CBC W/ AUTO DIFFERENTIAL    Narrative:     The following orders were created for panel order CBC auto differential.  Procedure                               Abnormality         Status                     ---------                               -----------         ------                     CBC with Differential[[id number]]                            Final result                 Please view results for these tests on the individual orders.   SALICYLATE LEVEL   CBC WITH DIFFERENTIAL   HCG QUALITATIVE URINE          Imaging Results    None          Medications   cephALEXin capsule 500 mg (500 mg  Oral Given 2/9/23 2015)     Medical Decision Making:   History:   I obtained history from: someone other than patient.       <> Summary of History: Patient's mother reports that patient through his cellphone and mother  Initial Assessment:   Patient was brought emergency room by mother with chief complaints on the anger bipolar mood, auditory hallucinations, patient has history of schizoaffective disorder, last inpatient psychiatrist evaluation was 1 ago, patient was recently started on monthly injections of Invega, December 16, 2022 patient had Invega injection which supposed to last for 3 months, 10 days ago mother noted patient to be very angry, through his cell phone in the mother, did not sleep for 3 days, physical exam revealed hyperactive female  Differential Diagnosis:   Paranoid Schizophrenia, drug abuse  Clinical Tests:   Lab Tests: Ordered and Reviewed  The following lab test(s) were unremarkable: CBC, CMP, Lipase and Urinalysis           ED Course as of 02/10/23 0201   Thu Feb 09, 2023 1926 Patient was PECs and 1925 [IP]   2031 Patient is medically cleared [IP]   2150 Patient was accepted by Dr. 

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
## 2023-02-10 NOTE — H&P
[facility]  History & Physical    Subjective:      Chief Complaint/Reason for Admission: admitted from ER with possible hallucinations     Brit Baird is a 27 y.o. female. Sent on PEC with possible hallucinations and definite harjinder    Past Medical History:   Diagnosis Date    Schizo affective schizophrenia      No past surgical history on file.  Family History   Problem Relation Age of Onset    Heart disease Father      Social History     Tobacco Use    Smoking status: Never    Smokeless tobacco: Never   Substance Use Topics    Alcohol use: Never    Drug use: Never       PTA Medications   Medication Sig    benztropine (COGENTIN) 1 MG tablet Take 1 mg by mouth every evening.    cephalexin (KEFTAB) 500 mg tablet Take 1 tablet (500 mg total) by mouth 3 (three) times daily. for 7 days    ergocalciferol (ERGOCALCIFEROL) 50,000 unit Cap TAKE 1 CAPSULE BY MOUTH ONCE A WEEK FOR VITAMIN D    fenofibrate micronized (LOFIBRA) 134 MG Cap Take 134 mg by mouth once daily. Take one capsule daily with meals    FLUoxetine 10 MG capsule Take 10 mg by mouth once daily.    fluticasone propionate (FLONASE) 50 mcg/actuation nasal spray 1 spray by Each Nostril route Daily.    INVEGA TRINZA 546 mg/1.75 mL Syrg SMARTSI.75 Milliliter(s) IM Every 3 Months    lithium (ESKALITH) 300 MG capsule Take 60 mg by mouth 2 (two) times a day.    lithium carbonate 150 MG capsule Take 150 mg by mouth 2 (two) times daily.    loratadine (CLARITIN) 10 mg tablet Take 10 mg by mouth once daily.    norgestimate-ethinyl estradioL (ORTHO TRI-CYCLEN LO) 0.18/0.215/0.25 mg-25 mcg tablet Take 1 tablet by mouth once daily.    paliperidone (INVEGA) 6 MG TR24 Take 6 mg by mouth once daily.    traZODone (DESYREL) 100 MG tablet Take 100 mg by mouth every evening.     Review of patient's allergies indicates:   Allergen Reactions    Ibuprofen         Review of Systems   Constitutional: Negative.    HENT: Negative.     Eyes:  Negative.    Respiratory: Negative.     Cardiovascular: Negative.    Gastrointestinal: Negative.    Genitourinary: Negative.    Musculoskeletal: Negative.    Skin: Negative.    Neurological: Negative.    Endo/Heme/Allergies: Negative.    Psychiatric/Behavioral:  Positive for hallucinations. Negative for depression, substance abuse and suicidal ideas.      Objective:      Vital Signs (Most Recent)  Temp: 98.6 °F (37 °C) (02/10/23 0601)  Pulse: 91 (02/10/23 0601)  Resp: 20 (02/10/23 0601)  BP: 139/84 (02/10/23 0601)    Vital Signs Range (Last 24H):  Temp:  [98.1 °F (36.7 °C)-98.6 °F (37 °C)]   Pulse:  [87-93]   Resp:  [18-20]   BP: (129-147)/(72-90)   SpO2:  [97 %-100 %]     Physical Exam  HENT:      Head: Normocephalic.      Right Ear: Tympanic membrane normal.      Left Ear: Tympanic membrane normal.      Nose: Nose normal.      Mouth/Throat:      Mouth: Mucous membranes are moist.   Eyes:      Extraocular Movements: Extraocular movements intact.      Pupils: Pupils are equal, round, and reactive to light.   Cardiovascular:      Rate and Rhythm: Normal rate and regular rhythm.   Pulmonary:      Effort: Pulmonary effort is normal.   Abdominal:      General: Abdomen is flat.   Musculoskeletal:         General: Normal range of motion.   Skin:     General: Skin is warm.   Neurological:      General: No focal deficit present.      Mental Status: She is alert and oriented to person, place, and time.      Comments: Vision normal   Hearing normal   EOM intact   Face muscles normal  Facial sensation normal   Shrugs shoulders  Tongue midline          Data Review:    Recent Results (from the past 48 hour(s))   Comprehensive metabolic panel    Collection Time: 02/09/23  7:29 PM   Result Value Ref Range    Sodium Level 140 136 - 145 mmol/L    Potassium Level 3.9 3.5 - 5.1 mmol/L    Chloride 110 (H) 98 - 107 mmol/L    Carbon Dioxide 22 22 - 29 mmol/L    Glucose Level 105 (H) 74 - 100 mg/dL    Blood Urea Nitrogen 12.0 7.0 - 18.7  mg/dL    Creatinine 0.87 0.55 - 1.02 mg/dL    Calcium Level Total 9.0 8.4 - 10.2 mg/dL    Protein Total 7.2 6.4 - 8.3 gm/dL    Albumin Level 4.3 3.5 - 5.0 g/dL    Globulin 2.9 2.4 - 3.5 gm/dL    Albumin/Globulin Ratio 1.5 1.1 - 2.0 ratio    Bilirubin Total 0.2 <=1.5 mg/dL    Alkaline Phosphatase 37 (L) 40 - 150 unit/L    Alanine Aminotransferase 26 0 - 55 unit/L    Aspartate Aminotransferase 19 5 - 34 unit/L    eGFR >60 mls/min/1.73/m2   TSH    Collection Time: 02/09/23  7:29 PM   Result Value Ref Range    Thyroid Stimulating Hormone 1.222 0.350 - 4.940 uIU/mL   Ethanol    Collection Time: 02/09/23  7:29 PM   Result Value Ref Range    Ethanol Level <10.0 <=10.0 mg/dL   Acetaminophen level    Collection Time: 02/09/23  7:29 PM   Result Value Ref Range    Acetaminophen Level <17.4 (L) 17.4 - 30.0 ug/ml   Salicylate Level    Collection Time: 02/09/23  7:29 PM   Result Value Ref Range    Salicylate Level <5.0 mg/dL   CBC with Differential    Collection Time: 02/09/23  7:29 PM   Result Value Ref Range    WBC 6.2 4.5 - 11.5 x10(3)/mcL    RBC 4.78 4.20 - 5.40 x10(6)/mcL    Hgb 14.2 12.0 - 16.0 gm/dL    Hct 42.0 37.0 - 47.0 %    MCV 87.9 80.0 - 94.0 fL    MCH 29.7 pg    MCHC 33.8 33.0 - 36.0 mg/dL    RDW 11.9 11.5 - 17.0 %    Platelet 216 130 - 400 x10(3)/mcL    MPV 9.7 7.4 - 10.4 fL    Neut % 58.2 %    Lymph % 32.3 %    Mono % 6.9 %    Eos % 1.5 %    Basophil % 0.8 %    Lymph # 2.00 0.6 - 4.6 x10(3)/mcL    Neut # 3.61 2.1 - 9.2 x10(3)/mcL    Mono # 0.43 0.1 - 1.3 x10(3)/mcL    Eos # 0.09 0 - 0.9 x10(3)/mcL    Baso # 0.05 0 - 0.2 x10(3)/mcL    IG# 0.02 0 - 0.04 x10(3)/mcL    IG% 0.3 %   Urinalysis, Reflex to Urine Culture    Collection Time: 02/09/23  7:32 PM    Specimen: Urine   Result Value Ref Range    Color, UA Dark Yellow Yellow, Light-Yellow, Dark Yellow, Ivonne, Straw    Appearance, UA Cloudy (A) Clear    Specific Gravity, UA 1.020     pH, UA 7.5 5.0 - 8.5    Protein, UA Trace (A) Negative mg/dL    Glucose, UA  Negative Negative, Normal mg/dL    Ketones, UA Negative Negative mg/dL    Blood, UA Negative Negative unit/L    Bilirubin, UA Negative Negative mg/dL    Urobilinogen, UA 0.2 0.2, 1.0, Normal mg/dL    Nitrites, UA Negative Negative    Leukocyte Esterase, UA Small (A) Negative unit/L   Drug Screen panel, emergency    Collection Time: 02/09/23  7:32 PM   Result Value Ref Range    Amphetamines, Urine Negative Negative    Barbituates, Urine Negative Negative    Benzodiazepine, Urine Negative Negative    Cannabinoids, Urine Negative Negative    Cocaine, Urine Negative Negative    Fentanyl, Urine Positive (A) Negative    MDMA, Urine Negative Negative    Opiates, Urine Negative Negative    Phencyclidine, Urine Negative Negative    pH, Urine 7.5 3.0 - 11.0    Specific Gravity, Urine Auto 1.020 1.001 - 1.035   COVID/FLU A&B PCR    Collection Time: 02/09/23  7:32 PM   Result Value Ref Range    Influenza A PCR Not Detected Not Detected    Influenza B PCR Not Detected Not Detected    SARS-CoV-2 PCR Not Detected Not Detected, Negative, Invalid   Urinalysis, Microscopic    Collection Time: 02/09/23  7:32 PM   Result Value Ref Range    Bacteria, UA Few (A) None Seen, Rare, Occasional /HPF    RBC, UA 0-2 None Seen, 0-2, 3-5, 0-5 /HPF    WBC, UA 6-10 (A) None Seen, 0-2, 3-5, 0-5 /HPF    Squamous Epithelial Cells, UA Few (A) None Seen, Rare, Occasional, Occ /HPF   HCG Qualitative Urine    Collection Time: 02/09/23  8:40 PM   Result Value Ref Range    Beta hCG Qualitative, Urine Negative Negative   Comprehensive metabolic panel    Collection Time: 02/10/23  6:32 AM   Result Value Ref Range    Sodium Level 142 136 - 145 mmol/L    Potassium Level 4.0 3.5 - 5.1 mmol/L    Chloride 110 (H) 98 - 107 mmol/L    Carbon Dioxide 23 22 - 29 mmol/L    Glucose Level 111 (H) 74 - 100 mg/dL    Blood Urea Nitrogen 10.2 7.0 - 18.7 mg/dL    Creatinine 0.76 0.55 - 1.02 mg/dL    Calcium Level Total 9.6 8.4 - 10.2 mg/dL    Protein Total 6.7 6.4 - 8.3 gm/dL     Albumin Level 4.1 3.5 - 5.0 g/dL    Globulin 2.6 2.4 - 3.5 gm/dL    Albumin/Globulin Ratio 1.6 1.1 - 2.0 ratio    Bilirubin Total 0.2 <=1.5 mg/dL    Alkaline Phosphatase 28 (L) 40 - 150 unit/L    Alanine Aminotransferase 24 0 - 55 unit/L    Aspartate Aminotransferase 19 5 - 34 unit/L    eGFR >60 mls/min/1.73/m2   Hemoglobin A1C    Collection Time: 02/10/23  6:32 AM   Result Value Ref Range    Hemoglobin A1c 5.0 <=7.0 %    Estimated Average Glucose 96.8 mg/dL   Lipid panel    Collection Time: 02/10/23  6:32 AM   Result Value Ref Range    Cholesterol Total 147 <=200 mg/dL    HDL Cholesterol 29 (L) 35 - 60 mg/dL    Triglyceride 293 (H) 37 - 140 mg/dL    Cholesterol/HDL Ratio 5 0 - 5    Very Low Density Lipoprotein 59     LDL Cholesterol 59.00 50.00 - 140.00 mg/dL   TSH    Collection Time: 02/10/23  6:32 AM   Result Value Ref Range    Thyroid Stimulating Hormone 2.364 0.350 - 4.940 uIU/mL   SYPHILIS ANTIBODY (WITH REFLEX RPR)    Collection Time: 02/10/23  6:32 AM   Result Value Ref Range    Syphilis Antibody Nonreactive Nonreactive, Equivocal        No results found.       Assessment and Plan       UDS positive for fentanyl  Mary Alice

## 2023-02-10 NOTE — PLAN OF CARE
Problem: Behavior Regulation Impairment (Psychotic Signs/Symptoms)  Goal: Improved Behavioral Control (Psychotic Signs/Symptoms)  Outcome: Ongoing, Progressing     Problem: Cognitive Impairment (Psychotic Signs/Symptoms)  Goal: Optimal Cognitive Function (Psychotic Signs/Symptoms)  Outcome: Ongoing, Progressing     Problem: Decreased Participation and Engagement (Psychotic Signs/Symptoms)  Goal: Increased Participation and Engagement (Psychotic Signs/Symptoms)  Outcome: Ongoing, Progressing     Problem: Mood Impairment (Psychotic Signs/Symptoms)  Goal: Improved Mood Symptoms (Psychotic Signs/Symptoms)  Outcome: Ongoing, Progressing     Problem: Psychomotor Impairment (Psychotic Signs/Symptoms)  Goal: Improved Psychomotor Symptoms (Psychotic Signs/Symptoms)  Outcome: Ongoing, Progressing     Problem: Sensory Perception Impairment (Psychotic Signs/Symptoms)  Goal: Decreased Sensory Symptoms (Psychotic Signs/Symptoms)  Outcome: Ongoing, Progressing
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
## 2023-02-10 NOTE — PLAN OF CARE
Problem: Adult Inpatient Plan of Care  Goal: Plan of Care Review  Outcome: Ongoing, Progressing  Goal: Patient-Specific Goal (Individualized)  Outcome: Ongoing, Progressing  Goal: Absence of Hospital-Acquired Illness or Injury  Outcome: Ongoing, Progressing  Goal: Optimal Comfort and Wellbeing  Outcome: Ongoing, Progressing  Goal: Readiness for Transition of Care  Outcome: Ongoing, Progressing     Problem: Behavior Regulation Impairment (Psychotic Signs/Symptoms)  Goal: Improved Behavioral Control (Psychotic Signs/Symptoms)  Outcome: Ongoing, Progressing     Problem: Cognitive Impairment (Psychotic Signs/Symptoms)  Goal: Optimal Cognitive Function (Psychotic Signs/Symptoms)  Outcome: Ongoing, Progressing     Problem: Decreased Participation and Engagement (Psychotic Signs/Symptoms)  Goal: Increased Participation and Engagement (Psychotic Signs/Symptoms)  Outcome: Ongoing, Progressing     Problem: Mood Impairment (Psychotic Signs/Symptoms)  Goal: Improved Mood Symptoms (Psychotic Signs/Symptoms)  Outcome: Ongoing, Progressing     Problem: Psychomotor Impairment (Psychotic Signs/Symptoms)  Goal: Improved Psychomotor Symptoms (Psychotic Signs/Symptoms)  Outcome: Ongoing, Progressing     Problem: Sensory Perception Impairment (Psychotic Signs/Symptoms)  Goal: Decreased Sensory Symptoms (Psychotic Signs/Symptoms)  Outcome: Ongoing, Progressing     Problem: Violence Risk or Actual  Goal: Anger and Impulse Control  Outcome: Ongoing, Progressing   Affect is flat,  voices anxiety and depression.  Denies SI and HI.  Denies auditory and visual hallucinations.  Compliant with new medications ordered.

## 2023-02-11 LAB — LITHIUM SERPL-MCNC: <0.1 MMOL/L (ref 0.5–1.2)

## 2023-02-11 PROCEDURE — 25000003 PHARM REV CODE 250: Performed by: PSYCHIATRY & NEUROLOGY

## 2023-02-11 PROCEDURE — 12400001 HC PSYCH SEMI-PRIVATE ROOM

## 2023-02-11 PROCEDURE — 25000003 PHARM REV CODE 250: Performed by: GENERAL ACUTE CARE HOSPITAL

## 2023-02-11 PROCEDURE — 25000003 PHARM REV CODE 250

## 2023-02-11 RX ADMIN — LITHIUM CARBONATE 150 MG: 150 CAPSULE, GELATIN COATED ORAL at 08:02

## 2023-02-11 RX ADMIN — BENZTROPINE MESYLATE 1 MG: 1 TABLET ORAL at 08:02

## 2023-02-11 RX ADMIN — CEPHALEXIN 500 MG: 500 CAPSULE ORAL at 06:02

## 2023-02-11 RX ADMIN — CEPHALEXIN 500 MG: 500 CAPSULE ORAL at 02:02

## 2023-02-11 RX ADMIN — TRAZODONE HYDROCHLORIDE 100 MG: 100 TABLET ORAL at 08:02

## 2023-02-11 RX ADMIN — CEPHALEXIN 500 MG: 500 CAPSULE ORAL at 09:02

## 2023-02-11 NOTE — PLAN OF CARE
Problem: Adult Inpatient Plan of Care  Goal: Plan of Care Review  Outcome: Ongoing, Progressing  Goal: Patient-Specific Goal (Individualized)  Outcome: Ongoing, Progressing  Goal: Absence of Hospital-Acquired Illness or Injury  Outcome: Ongoing, Progressing  Goal: Optimal Comfort and Wellbeing  Outcome: Ongoing, Progressing  Goal: Readiness for Transition of Care  Outcome: Ongoing, Progressing     Problem: Behavior Regulation Impairment (Psychotic Signs/Symptoms)  Goal: Improved Behavioral Control (Psychotic Signs/Symptoms)  Outcome: Ongoing, Progressing     Problem: Cognitive Impairment (Psychotic Signs/Symptoms)  Goal: Optimal Cognitive Function (Psychotic Signs/Symptoms)  Outcome: Ongoing, Progressing     Problem: Decreased Participation and Engagement (Psychotic Signs/Symptoms)  Goal: Increased Participation and Engagement (Psychotic Signs/Symptoms)  Outcome: Ongoing, Progressing     Problem: Mood Impairment (Psychotic Signs/Symptoms)  Goal: Improved Mood Symptoms (Psychotic Signs/Symptoms)  Outcome: Ongoing, Progressing     Problem: Psychomotor Impairment (Psychotic Signs/Symptoms)  Goal: Improved Psychomotor Symptoms (Psychotic Signs/Symptoms)  Outcome: Ongoing, Progressing     Problem: Sensory Perception Impairment (Psychotic Signs/Symptoms)  Goal: Decreased Sensory Symptoms (Psychotic Signs/Symptoms)  Outcome: Ongoing, Progressing     Problem: Violence Risk or Actual  Goal: Anger and Impulse Control  Outcome: Ongoing, Progressing     At onset of shift, patient observed sitting in activity room, dress appropriate for weather/unit, cooperative with requests, behavior appropriate/no threatening behaviors observed. Frequently seeks out staff with questions. Denies any current suicidal or homicidal ideation. Affect labile, reports anxiety and depression. Denies hallucinations currently, no apparent delusions. Reported difficulty falling asleep and requested and received PRN Trazodone for sleep. Compliant with  plan of care, medications prescribed. No adverse effects reported or observed from medications. Safety checks ongoing, q15min observation, q2hr nurse rounds, suicide/homicide precautions, violent precautions.

## 2023-02-11 NOTE — PLAN OF CARE
Problem: Adult Inpatient Plan of Care  Goal: Plan of Care Review  Outcome: Ongoing, Progressing  Goal: Patient-Specific Goal (Individualized)  Outcome: Ongoing, Progressing  Goal: Absence of Hospital-Acquired Illness or Injury  Outcome: Ongoing, Progressing  Goal: Optimal Comfort and Wellbeing  Outcome: Ongoing, Progressing  Goal: Readiness for Transition of Care  Outcome: Ongoing, Progressing     Problem: Behavior Regulation Impairment (Psychotic Signs/Symptoms)  Goal: Improved Behavioral Control (Psychotic Signs/Symptoms)  Outcome: Ongoing, Progressing     Problem: Cognitive Impairment (Psychotic Signs/Symptoms)  Goal: Optimal Cognitive Function (Psychotic Signs/Symptoms)  Outcome: Ongoing, Progressing     Problem: Decreased Participation and Engagement (Psychotic Signs/Symptoms)  Goal: Increased Participation and Engagement (Psychotic Signs/Symptoms)  Outcome: Ongoing, Progressing     Problem: Mood Impairment (Psychotic Signs/Symptoms)  Goal: Improved Mood Symptoms (Psychotic Signs/Symptoms)  Outcome: Ongoing, Progressing     Problem: Psychomotor Impairment (Psychotic Signs/Symptoms)  Goal: Improved Psychomotor Symptoms (Psychotic Signs/Symptoms)  Outcome: Ongoing, Progressing     Problem: Sensory Perception Impairment (Psychotic Signs/Symptoms)  Goal: Decreased Sensory Symptoms (Psychotic Signs/Symptoms)  Outcome: Ongoing, Progressing     Problem: Violence Risk or Actual  Goal: Anger and Impulse Control  Outcome: Ongoing, Progressing

## 2023-02-11 NOTE — PLAN OF CARE
Patient is calm and cooperative.  Denies SI and HI.  Stated she slept well last night.  Affect remains flat.  Continues to voice anxiety and depression.  Appearance is unkept.  Will continue to monitor.

## 2023-02-12 PROCEDURE — 25000003 PHARM REV CODE 250

## 2023-02-12 PROCEDURE — 25000003 PHARM REV CODE 250: Performed by: PSYCHIATRY & NEUROLOGY

## 2023-02-12 PROCEDURE — 25000003 PHARM REV CODE 250: Performed by: GENERAL ACUTE CARE HOSPITAL

## 2023-02-12 PROCEDURE — 12400001 HC PSYCH SEMI-PRIVATE ROOM

## 2023-02-12 RX ADMIN — CEPHALEXIN 500 MG: 500 CAPSULE ORAL at 10:02

## 2023-02-12 RX ADMIN — TRAZODONE HYDROCHLORIDE 100 MG: 100 TABLET ORAL at 08:02

## 2023-02-12 RX ADMIN — LITHIUM CARBONATE 150 MG: 150 CAPSULE, GELATIN COATED ORAL at 08:02

## 2023-02-12 RX ADMIN — CEPHALEXIN 500 MG: 500 CAPSULE ORAL at 02:02

## 2023-02-12 RX ADMIN — BENZTROPINE MESYLATE 1 MG: 1 TABLET ORAL at 08:02

## 2023-02-12 RX ADMIN — CEPHALEXIN 500 MG: 500 CAPSULE ORAL at 06:02

## 2023-02-12 NOTE — PLAN OF CARE
Problem: Adult Inpatient Plan of Care  Goal: Plan of Care Review  Outcome: Ongoing, Progressing  Goal: Patient-Specific Goal (Individualized)  Outcome: Ongoing, Progressing  Goal: Absence of Hospital-Acquired Illness or Injury  Outcome: Ongoing, Progressing  Goal: Optimal Comfort and Wellbeing  Outcome: Ongoing, Progressing  Goal: Readiness for Transition of Care  Outcome: Ongoing, Progressing     Problem: Behavior Regulation Impairment (Psychotic Signs/Symptoms)  Goal: Improved Behavioral Control (Psychotic Signs/Symptoms)  Outcome: Ongoing, Progressing     Problem: Cognitive Impairment (Psychotic Signs/Symptoms)  Goal: Optimal Cognitive Function (Psychotic Signs/Symptoms)  Outcome: Ongoing, Progressing     Problem: Decreased Participation and Engagement (Psychotic Signs/Symptoms)  Goal: Increased Participation and Engagement (Psychotic Signs/Symptoms)  Outcome: Ongoing, Progressing     Problem: Mood Impairment (Psychotic Signs/Symptoms)  Goal: Improved Mood Symptoms (Psychotic Signs/Symptoms)  Outcome: Ongoing, Progressing     Problem: Psychomotor Impairment (Psychotic Signs/Symptoms)  Goal: Improved Psychomotor Symptoms (Psychotic Signs/Symptoms)  Outcome: Ongoing, Progressing     Problem: Sensory Perception Impairment (Psychotic Signs/Symptoms)  Goal: Decreased Sensory Symptoms (Psychotic Signs/Symptoms)  Outcome: Ongoing, Progressing     Problem: Violence Risk or Actual  Goal: Anger and Impulse Control  Outcome: Ongoing, Progressing    Pt observed at onset of shift ambulating in hallway, alert, oriented x4, good eye contact. Affect flat, reports improving anxiety and depression. Speech clear/coherent. Cooperative with unit rules/plan of care/medications prescribed. No reported adverse effects from medications and none noted/observed. No threatening behaviors observed. Interaction with others promoted. Encouraged to verbalize feelings and empathetic listening provided. Denied current SI/HI/hallucinations, no  apparent delusions noted. Safety checks ongoing, q15min observation, q2hr nurse rounds, suicidal/homicidal/violence precautions.

## 2023-02-12 NOTE — NURSING
Patient requested medication to help with sleep. Given PRN Trazodone 100mg PO PRN sleep at 2003. Effectiveness monitored, patient observed at 2103 in bed, supine position, eyes closed, respirations even and unlabored, NAD noted.

## 2023-02-12 NOTE — PLAN OF CARE
Problem: Adult Inpatient Plan of Care  Goal: Plan of Care Review  Outcome: Ongoing, Progressing  Goal: Patient-Specific Goal (Individualized)  Outcome: Ongoing, Progressing  Goal: Absence of Hospital-Acquired Illness or Injury  Outcome: Ongoing, Progressing  Goal: Optimal Comfort and Wellbeing  Outcome: Ongoing, Progressing  Goal: Readiness for Transition of Care  Outcome: Ongoing, Progressing     Problem: Behavior Regulation Impairment (Psychotic Signs/Symptoms)  Goal: Improved Behavioral Control (Psychotic Signs/Symptoms)  Outcome: Ongoing, Progressing     Problem: Cognitive Impairment (Psychotic Signs/Symptoms)  Goal: Optimal Cognitive Function (Psychotic Signs/Symptoms)  Outcome: Ongoing, Progressing     Problem: Decreased Participation and Engagement (Psychotic Signs/Symptoms)  Goal: Increased Participation and Engagement (Psychotic Signs/Symptoms)  Outcome: Ongoing, Progressing     Problem: Mood Impairment (Psychotic Signs/Symptoms)  Goal: Improved Mood Symptoms (Psychotic Signs/Symptoms)  Outcome: Ongoing, Progressing     Problem: Psychomotor Impairment (Psychotic Signs/Symptoms)  Goal: Improved Psychomotor Symptoms (Psychotic Signs/Symptoms)  Outcome: Ongoing, Progressing     Problem: Sensory Perception Impairment (Psychotic Signs/Symptoms)  Goal: Decreased Sensory Symptoms (Psychotic Signs/Symptoms)  Outcome: Ongoing, Progressing     Problem: Violence Risk or Actual  Goal: Anger and Impulse Control  Outcome: Ongoing, Progressing   Cooperative with medications.  Denies SI and HI.  Denies auditory and visual hallucinations.  Continues to voice anxiety and depression.  Attends group and listens attentively, very engaged in conversation.  Will continue to monitor.

## 2023-02-13 PROCEDURE — 25000003 PHARM REV CODE 250: Performed by: GENERAL ACUTE CARE HOSPITAL

## 2023-02-13 PROCEDURE — 25000003 PHARM REV CODE 250: Performed by: PSYCHIATRY & NEUROLOGY

## 2023-02-13 PROCEDURE — 12400001 HC PSYCH SEMI-PRIVATE ROOM

## 2023-02-13 PROCEDURE — 25000003 PHARM REV CODE 250

## 2023-02-13 RX ORDER — LITHIUM CARBONATE 150 MG/1
300 CAPSULE ORAL NIGHTLY
Status: DISCONTINUED | OUTPATIENT
Start: 2023-02-13 | End: 2023-02-16 | Stop reason: HOSPADM

## 2023-02-13 RX ORDER — LITHIUM CARBONATE 150 MG/1
150 CAPSULE ORAL DAILY
Status: DISCONTINUED | OUTPATIENT
Start: 2023-02-14 | End: 2023-02-16 | Stop reason: HOSPADM

## 2023-02-13 RX ORDER — DOXEPIN HYDROCHLORIDE 10 MG/1
10 CAPSULE ORAL NIGHTLY PRN
Status: DISCONTINUED | OUTPATIENT
Start: 2023-02-13 | End: 2023-02-15

## 2023-02-13 RX ADMIN — CEPHALEXIN 500 MG: 500 CAPSULE ORAL at 02:02

## 2023-02-13 RX ADMIN — CEPHALEXIN 500 MG: 500 CAPSULE ORAL at 08:02

## 2023-02-13 RX ADMIN — BENZTROPINE MESYLATE 1 MG: 1 TABLET ORAL at 08:02

## 2023-02-13 RX ADMIN — LITHIUM CARBONATE 150 MG: 150 CAPSULE, GELATIN COATED ORAL at 08:02

## 2023-02-13 RX ADMIN — LITHIUM CARBONATE 300 MG: 150 CAPSULE, GELATIN COATED ORAL at 09:02

## 2023-02-13 RX ADMIN — CEPHALEXIN 500 MG: 500 CAPSULE ORAL at 10:02

## 2023-02-13 RX ADMIN — CEPHALEXIN 500 MG: 500 CAPSULE ORAL at 06:02

## 2023-02-13 NOTE — PROGRESS NOTES
"2/13/2023 12:50 PM   Brit Baird   1995   [id number]        Psychiatry Progress Note     Chief Complaint: Harjinder    SUBJECTIVE:   Brit Baird is a 27 y.o. female placed under a PEC at [facility] for harjinder.  Followed by [facility]ambrano [place] [facility]  She stats that her weekend was "terrible" because she believes that she was poisoned with fentanyl.  She states that her mother told her that her behavior had changed after she had started taking a nutritional powder that she ordered off of the internet.  She denies substance use and is unsure where fentanyl would have come from (UDS was positive).  She states that was taking Prozac at home, which may have contributed to harjinder.  Took Invega Trinza 546mg IM on 12/14.  Will titrate lithium dose in PM and will schedule lithium level on 2/16.  Takes Cogentin for oculogyric issues.  Has tolerated current medication regimen here.    UDS: (+)fentanyl  Blood alcohol: <10       Current Medications:   Scheduled Meds:    benztropine  1 mg Oral Daily    cephALEXin  500 mg Oral Q8H    lithium carbonate  150 mg Oral BID    nicotine  1 patch Transdermal Daily      PRN Meds: acetaminophen, aluminum-magnesium hydroxide-simethicone, diphenhydrAMINE, haloperidol lactate, hydrOXYzine HCL, lorazepam, magnesium hydroxide 400 mg/5 ml, ondansetron, promethazine, trazodone   Psychotherapeutics (From admission, onward)      Start     Stop Route Frequency Ordered    02/10/23 1100  lithium capsule         -- Oral 2 times daily 02/10/23 1047    02/10/23 0527  LORazepam injection 2 mg        Question:  Is the patient competent?  Answer:  Yes    -- IM Every 4 hours PRN 02/10/23 0527    02/10/23 0527  traZODone tablet 100 mg        Question:  Is the patient competent?  Answer:  Yes    -- Oral Nightly PRN 02/10/23 0527    02/10/23 0527  haloperidol lactate injection 10 mg         -- IM Every 4 hours PRN 02/10/23 0527            Allergies:   Review of patient's allergies indicates: " "  Allergen Reactions    Ibuprofen         OBJECTIVE:   Vitals   Vitals:    02/13/23 1100   BP: 129/85   Pulse: 97   Resp: 19   Temp: 98.8 °F (37.1 °C)        Labs/Imaging/Studies:   No results found for this or any previous visit (from the past 36 hour(s)).       Medical Review Of Systems:  Constitutional: negative  Respiratory: negative  Cardiovascular: negative  Gastrointestinal: negative  Genitourinary:negative  Musculoskeletal:negative  Neurological: negative      Psychiatric Mental Status Exam:  General Appearance: appears stated age, well-developed, well-nourished  Arousal: alert  Behavior: cooperative  Movements and Motor Activity: no abnormal involuntary movements noted  Orientation: oriented to person, place, time, and situation  Speech: normal rate, normal rhythm, normal volume, normal tone  Mood: "Ok"  Affect: restricted  Thought Process: linear  Associations: intact  Thought Content and Perceptions: (+)recent hallucinations, no suicidal ideation, no homicidal ideation, no paranoid ideation, no ideas of reference  Recent and Remote Memory: recent memory intact, remote memory intact; per interview/observation with patient  Attention and Concentration: intact, attentive to conversation; per interview/observation with patient  Fund of Knowledge: intact, aware of current events, vocabulary appropriate; based on history, vocabulary, fund of knowledge, syntax, grammar, and content  Insight: intact; based on understanding of severity of illness and HPI  Judgment: questionable; based on patient's behavior and HPI      ASSESSMENT/PLAN:   Problems Addressed/Diagnoses:  Bipolar disorder, most recent episode manic, severe, with psychotic symptoms (F31.2)  Reports h/o Schizoaffective disorder    Past Medical History:   Diagnosis Date    Schizo affective schizophrenia         Plan:  -Increase Lithium to 150mg daily and 300mg HS  -D/c Trazodone  -Doxepin 10mg HS PRN    Expected Disposition Plan: Home        [facility] NIKKI Burch M.D.

## 2023-02-13 NOTE — PLAN OF CARE
Problem: Adult Inpatient Plan of Care  Goal: Plan of Care Review  Outcome: Met  Goal: Patient-Specific Goal (Individualized)  Outcome: Ongoing, Progressing  Goal: Absence of Hospital-Acquired Illness or Injury  Outcome: Met  Intervention: Identify and Manage Fall Risk  Flowsheets (Taken 2/12/2023 2314)  Safety Promotion/Fall Prevention: nonskid shoes/socks when out of bed  Intervention: Prevent Skin Injury  Flowsheets (Taken 2/12/2023 2314)  Body Position: position changed independently  Intervention: Prevent and Manage VTE (Venous Thromboembolism) Risk  Flowsheets (Taken 2/12/2023 2314)  VTE Prevention/Management: ambulation promoted  Range of Motion: active ROM (range of motion) encouraged  Intervention: Prevent Infection  Flowsheets (Taken 2/12/2023 2314)  Infection Prevention: hand hygiene promoted  Goal: Optimal Comfort and Wellbeing  Outcome: Ongoing, Progressing  Goal: Readiness for Transition of Care  Outcome: Ongoing, Progressing     Problem: Behavior Regulation Impairment (Psychotic Signs/Symptoms)  Goal: Improved Behavioral Control (Psychotic Signs/Symptoms)  Outcome: Ongoing, Progressing     Problem: Cognitive Impairment (Psychotic Signs/Symptoms)  Goal: Optimal Cognitive Function (Psychotic Signs/Symptoms)  Outcome: Ongoing, Progressing     Problem: Decreased Participation and Engagement (Psychotic Signs/Symptoms)  Goal: Increased Participation and Engagement (Psychotic Signs/Symptoms)  Outcome: Ongoing, Progressing     Problem: Mood Impairment (Psychotic Signs/Symptoms)  Goal: Improved Mood Symptoms (Psychotic Signs/Symptoms)  Outcome: Ongoing, Progressing     Problem: Psychomotor Impairment (Psychotic Signs/Symptoms)  Goal: Improved Psychomotor Symptoms (Psychotic Signs/Symptoms)  Outcome: Ongoing, Progressing     Problem: Sensory Perception Impairment (Psychotic Signs/Symptoms)  Goal: Decreased Sensory Symptoms (Psychotic Signs/Symptoms)  Outcome: Ongoing, Progressing     Problem: Violence Risk or  Actual  Goal: Anger and Impulse Control  Outcome: Ongoing, Progressing

## 2023-02-13 NOTE — PLAN OF CARE
Problem: Behavior Regulation Impairment (Psychotic Signs/Symptoms)  Goal: Improved Behavioral Control (Psychotic Signs/Symptoms)  Outcome: Ongoing, Progressing     Problem: Cognitive Impairment (Psychotic Signs/Symptoms)  Goal: Optimal Cognitive Function (Psychotic Signs/Symptoms)  Outcome: Ongoing, Progressing     Problem: Decreased Participation and Engagement (Psychotic Signs/Symptoms)  Goal: Increased Participation and Engagement (Psychotic Signs/Symptoms)  Outcome: Ongoing, Progressing     Problem: Mood Impairment (Psychotic Signs/Symptoms)  Goal: Improved Mood Symptoms (Psychotic Signs/Symptoms)  Outcome: Ongoing, Progressing     Problem: Psychomotor Impairment (Psychotic Signs/Symptoms)  Goal: Improved Psychomotor Symptoms (Psychotic Signs/Symptoms)  Outcome: Ongoing, Progressing     Problem: Sensory Perception Impairment (Psychotic Signs/Symptoms)  Goal: Decreased Sensory Symptoms (Psychotic Signs/Symptoms)  Outcome: Ongoing, Progressing     Problem: Violence Risk or Actual  Goal: Anger and Impulse Control  Outcome: Ongoing, Progressing     Problem: Adult Inpatient Plan of Care  Goal: Patient-Specific Goal (Individualized)  Outcome: Met  Goal: Optimal Comfort and Wellbeing  Outcome: Met  Goal: Readiness for Transition of Care  Outcome: Met   Compliant with medication.  Denies SI and HI.  Denies auditory and visual hallucinations.  Affect is flat.  Continues to voice anxiety and depression.  Stated she only slept 4 hours last night.  Will continue to monitor.

## 2023-02-13 NOTE — PROGRESS NOTES
Patient requested sleep medication. Given Trazodone 100mg PO PRN sleep on 2/12/2023 @ 2016. Effectiveness monitored, on 2/12/2023 @ 2116 Patient observed in bed, supine position, eyes closed, respirations even and unlabored, NAD noted.

## 2023-02-13 NOTE — PROGRESS NOTES
02/12/23 2000   Handoff Report   Received From Maria Esther   Pain/Comfort/Sleep   Preferred Pain Scale number (Numeric Rating Pain Scale)   Comfort/Acceptable Pain Level 0   Pain Rating (0-10): Rest 0   Pain Rating (0-10): Activity 0   FACES Pain Rating: Rest 0-->no hurt   Coping/Psychosocial   Verbalized Emotional State anxiety;sadness   Plan of Care Reviewed With patient   Patient Agreement with Plan of Care agrees   Trust Relationship/Rapport questions encouraged;questions answered   STAMP Tool (Risk for Violence Assessment)   Staring Absence of behavior   Tone and Volume of Voice Absence of behavior   Anxiety Absence of behavior   Mumbling Absence of behavior   Pacing Absence of behavior   STAMP Score Negative STAMP Screening   Coping/Psychosocial Interventions   Behavior Management behavioral plan reviewed   Supportive Measures active listening utilized   Environmental Support distractions minimized   HEENT   HEENT WDL WDL   Mouth/Teeth WDL   Mouth/Teeth WDL WDL   Neck WDL   Neck WDL WDL   Cognitive/Neuro/Behavioral WDL   Cognitive/Neuro/Behavioral WDL ex;mood/behavior   Arousal Level opens eyes spontaneously   Orientation oriented x 4   Speech clear/fluent   Mood/Behavior anxious;sad   COWS (Clinical Opiate Withdrawal Scale)   Resting Pulse Rate 2-->pulse rate 101-120  (Pulse 102)   Sweating 0-->no report of chills or flushing   Restlessness 0-->able to sit still   Pupil Size 0-->pupils pinned or normal size for room light   Bone or Joint Aches 0-->not present   Runny Nose or Tearing 0-->none present   GI Upset 0-->no GI symptoms   Tremor 0-->no tremor   Yawning 0-->no yawning   Anxiety or Irritability 1-->patient reports increasing irritability or anxiousness   [place] Skin 0-->skin is smooth   Score 3   [place] Coma Scale   Best Eye Response 4-->(E4) spontaneous   Best Motor Response 6-->(M6) obeys commands   Best Verbal Response 5-->(V5) oriented   [place] Coma Scale Score 15   Behavioral   General  Appearance [WDL Definition: Well-kept, clean; dress appropriate for weather/appropriate for setting] WDL   General Appearance dress appropriate for weather/appropriate for setting   Behavior WDL   Behavior [WDL Definition: Appropriate to situation, cooperative, appropriate eye contact; erect posture, head raised, steady gait; no unusual gestures/mannerisms] WDL except;interactions   Behavior Interactions avoids social contact   Motor Movement no unusual gestures/mannerisms;steady gait;head raised   Emotion Mood WDL   Emotion/Mood/Affect [WDL Definition: Calm; euthymic; affect consistent with mood; facial expression relaxed, appropriate to situation] WDL except;affect;emotion mood   Affect blunted   Emotion/Mood/Affect Symptoms anxious;sad   Speech WDL   Speech [WDL Definition: Moderate rate and volume; clear, coherent; articulate; effective] WDL   Speech Symptoms clear, coherent;effective;articulate;moderate rate and volume   Perceptual State WDL   Perceptual State [WDL Definition: Consistent with reality; denies hallucinations] WDL   Hallucinations denies hallucinations   Perceptual State consistent with reality   Thought Process WDL   Thought Process [WDL Definition: Judgment and insight appropriate to situation; logical, relevant, and linear thought process] WDL except;judgment and insight   Delusions no delusions   Judgment and Insight judgment not appropriate to situation   Thought Content logical;relevant   Thought Process Symptoms linear thought process   Intellectual Performance WDL   Intellectual Performance [WDL Definition: Alert, oriented x 4; immediate, recent and remote memory intact; able to comprehend] WDL   Intellectual Performance Symptoms oriented x 4;able to comprehend   Level of Consciousness (AVPU) alert   Respiratory   Respiratory WDL WDL   Cardiac   Cardiac WDL WDL   Peripheral Neurovascular   Peripheral Neurovascular WDL WDL   Gastrointestinal   GI WDL WDL   Genitourinary   Genitourinary WDL  WDL   Skin   Skin WDL WDL   Ranjan Risk Assessment   Sensory Perception 4-->no impairment   Moisture 4-->rarely moist   Activity 4-->walks frequently   Mobility 4-->no limitation   Nutrition 3-->adequate   Friction and Shear 3-->no apparent problem   Ranjan Score 22   Musculoskeletal   Musculoskeletal WDL WDL   Functional Screen (every 3 days/change)   Ambulation 0 - independent   Transferring 0 - independent   Toileting 0 - independent   Bathing 0 - independent   Dressing 0 - independent   Eating 0 - independent   Communication 0 - understands/communicates without difficulty   Swallowing 0 - swallows foods/liquids without difficulty   Nutrition   Diet/Nutrition Received regular   Safety   Patient Location hallway   Observed Behavior walking   Infection Prevention rest/sleep promoted   Safety Measures safety rounds completed   James Psychiatric Fall Risk Tool   Age 8-->Less than 50   Mental Status -4-->Fully alert/oriented at all times   Elimination 8-->Independent with control of bowel/bladder   Medications 8-->Psychotropic medications   Diagnosis 10-->Bipolar/schizoaffective disorder   Ambulation/Balance 7-->Independent/steady gait/immobile   Nutrition 0-->No apparent abnormalities with appetite   Sleep Disturbance 8-->No sleep disturbance   History of Falls 8-->No history of falls   Score ([place] Fall Risk) 53   ABC Risk for Fall with Injury Assessment   A= Age: Is the patient greater than or equal to 85 years old or frail due to clinical condition? No   B=Bones: Does the patient have osteoporosis, previous fracture, prolonged steroid use, or metastatic bone cancer? No   C=Coagulation Disorders: Does the patient have a bleeding disorder, either through anticoagulants or underlying clinical condition? No   S=recent Surgery: Is the patient post-op surgicalwith a recent lower limb amputation or recent major abdominal or thoracic surgery? No   [place]erry Suicide Severity Rating Scale   1. Wish to be Dead: Have you  wished you were dead or wished you could go to sleep and not wake up? No   2. Suicidal Thoughts: Have you actually had any thoughts of killing yourself? No   3. Suicidal Thoughts with Method Without Specific Plan or Intent to Act: Have you been thinking about how you might kill yourself? No   4. Suicidal Intent Without Specific Plan: Have you had these thoughts and had some intention of acting on them? No   5. Suicide Intent with Specific Plan: Have you started to work out or worked out the details of how to kill yourself? Do you intend to carry out this plan? No   6. Suicide Behavior Question: Have you ever done anything, started to do anything, or prepared to do anything to end your life? No   Suicide Risk No Risk   Violence Risk   Feels Like Hurting Others no   Previous Attempt to Harm Others no   Violence Threats in Past 6 Months denies   Current Violence Plan or Thoughts denies   Safety Management    Patient Rounds visualized patient   Safety Promotion/Fall Prevention nonskid shoes/socks when out of bed   Daily Care   Activity (Behavioral Health) up ad radha   Mobility   GEMS ([place] Early Mobility Scale) Level 4-Independent activities   Positioning   Body Position position changed independently   Head of Bed (HOB) Positioning HOB flat   RN Clinical Review   I have evaluated the data collected on this patient and nursing care provided. Done

## 2023-02-14 PROCEDURE — 12400001 HC PSYCH SEMI-PRIVATE ROOM

## 2023-02-14 PROCEDURE — 25000003 PHARM REV CODE 250: Performed by: GENERAL ACUTE CARE HOSPITAL

## 2023-02-14 PROCEDURE — 25000003 PHARM REV CODE 250: Performed by: PSYCHIATRY & NEUROLOGY

## 2023-02-14 PROCEDURE — 25000003 PHARM REV CODE 250

## 2023-02-14 RX ADMIN — CEPHALEXIN 500 MG: 500 CAPSULE ORAL at 01:02

## 2023-02-14 RX ADMIN — LITHIUM CARBONATE 300 MG: 150 CAPSULE, GELATIN COATED ORAL at 08:02

## 2023-02-14 RX ADMIN — DOXEPIN HYDROCHLORIDE 10 MG: 10 CAPSULE ORAL at 08:02

## 2023-02-14 RX ADMIN — CEPHALEXIN 500 MG: 500 CAPSULE ORAL at 07:02

## 2023-02-14 RX ADMIN — CEPHALEXIN 500 MG: 500 CAPSULE ORAL at 10:02

## 2023-02-14 RX ADMIN — BENZTROPINE MESYLATE 1 MG: 1 TABLET ORAL at 08:02

## 2023-02-14 RX ADMIN — LITHIUM CARBONATE 150 MG: 150 CAPSULE, GELATIN COATED ORAL at 08:02

## 2023-02-14 NOTE — PROGRESS NOTES
"2/14/2023 12:50 PM   Brit Baird   1995   [id number]        Psychiatry Progress Note     Chief Complaint: Harjinder    SUBJECTIVE:   Brit Baird is a 27 y.o. female placed under a PEC at [facility] for harjinder.  Followed by JOYCE zambrano [place] (Ricky, PMHNP).  Has tolerated current medication regimen here. Today patient states that she is doing "OK". She denies any side effects to the medication. She states that she has noticed some improvements but there is still some room for improvement. She denies any SI/HI or AVH.     UDS: (+)fentanyl  Blood alcohol: <10       Current Medications:   Scheduled Meds:    benztropine  1 mg Oral Daily    cephALEXin  500 mg Oral Q8H    lithium carbonate  150 mg Oral Daily    lithium carbonate  300 mg Oral QHS      PRN Meds: acetaminophen, aluminum-magnesium hydroxide-simethicone, diphenhydrAMINE, doxepin, haloperidol lactate, hydrOXYzine HCL, lorazepam, magnesium hydroxide 400 mg/5 ml, ondansetron, promethazine   Psychotherapeutics (From admission, onward)      Start     Stop Route Frequency Ordered    02/14/23 0900  lithium capsule         -- Oral Daily 02/13/23 1307    02/13/23 2100  lithium capsule         -- Oral Nightly 02/13/23 1307    02/13/23 1405  doxepin capsule 10 mg         -- Oral Nightly PRN 02/13/23 1305    02/10/23 0527  LORazepam injection 2 mg        Question:  Is the patient competent?  Answer:  Yes    -- IM Every 4 hours PRN 02/10/23 0527    02/10/23 0527  haloperidol lactate injection 10 mg         -- IM Every 4 hours PRN 02/10/23 0527            Allergies:   Review of patient's allergies indicates:   Allergen Reactions    Ibuprofen         OBJECTIVE:   Vitals   Vitals:    02/13/23 1900   BP: 119/83   Pulse: 98   Resp: 18   Temp: 98.2 °F (36.8 °C)        Labs/Imaging/Studies:   No results found for this or any previous visit (from the past 36 hour(s)).       Medical Review Of Systems:  Constitutional: negative  Respiratory: negative  Cardiovascular: " "negative  Gastrointestinal: negative  Genitourinary:negative  Musculoskeletal:negative  Neurological: negative      Psychiatric Mental Status Exam:  General Appearance: appears stated age, well-developed, well-nourished  Arousal: alert  Behavior: cooperative  Movements and Motor Activity: no abnormal involuntary movements noted  Orientation: oriented to person, place, time, and situation  Speech: normal rate, normal rhythm, normal volume, normal tone  Mood: "Ok"  Affect: restricted  Thought Process: linear  Associations: intact  Thought Content and Perceptions: (+)recent hallucinations, no suicidal ideation, no homicidal ideation, no paranoid ideation, no ideas of reference  Recent and Remote Memory: recent memory intact, remote memory intact; per interview/observation with patient  Attention and Concentration: intact, attentive to conversation; per interview/observation with patient  Fund of Knowledge: intact, aware of current events, vocabulary appropriate; based on history, vocabulary, fund of knowledge, syntax, grammar, and content  Insight: intact; based on understanding of severity of illness and HPI  Judgment: questionable; based on patient's behavior and HPI      ASSESSMENT/PLAN:   Problems Addressed/Diagnoses:  Bipolar disorder, most recent episode manic, severe, with psychotic symptoms (F31.2)  Reports h/o Schizoaffective disorder    Past Medical History:   Diagnosis Date    Schizo affective schizophrenia         Plan:  -Continue with current POC    Expected Disposition Plan: Home        WIN Zuñiga  "

## 2023-02-14 NOTE — PLAN OF CARE
Problem: Behavior Regulation Impairment (Psychotic Signs/Symptoms)  Goal: Improved Behavioral Control (Psychotic Signs/Symptoms)  Outcome: Ongoing, Progressing     Problem: Cognitive Impairment (Psychotic Signs/Symptoms)  Goal: Optimal Cognitive Function (Psychotic Signs/Symptoms)  Outcome: Ongoing, Progressing     Problem: Decreased Participation and Engagement (Psychotic Signs/Symptoms)  Goal: Increased Participation and Engagement (Psychotic Signs/Symptoms)  Outcome: Ongoing, Progressing     Problem: Mood Impairment (Psychotic Signs/Symptoms)  Goal: Improved Mood Symptoms (Psychotic Signs/Symptoms)  Outcome: Ongoing, Progressing     Problem: Psychomotor Impairment (Psychotic Signs/Symptoms)  Goal: Improved Psychomotor Symptoms (Psychotic Signs/Symptoms)  Outcome: Ongoing, Progressing     Problem: Sensory Perception Impairment (Psychotic Signs/Symptoms)  Goal: Decreased Sensory Symptoms (Psychotic Signs/Symptoms)  Outcome: Ongoing, Progressing     Problem: Violence Risk or Actual  Goal: Anger and Impulse Control  Outcome: Ongoing, Progressing     Problem: Activity and Energy Impairment (Depressive Signs/Symptoms)  Goal: Optimized Energy Level (Depressive Signs/Symptoms)  Outcome: Ongoing, Progressing     Problem: Cognitive Impairment (Depressive Signs/Symptoms)  Goal: Optimized Cognitive Function  Outcome: Ongoing, Progressing     Problem: Decreased Participation/Engagement (Depressive Signs/Symptoms)  Goal: Increased Participation and Engagement (Depressive Signs/Symptoms)  Outcome: Ongoing, Progressing     Problem: Feelings of Worthlessness, Hopelessness or Excessive Guilt (Depressive Signs/Symptoms)  Goal: Enhanced Self-Esteem and Confidence (Depressive Signs/Symptoms)  Outcome: Ongoing, Progressing     Problem: Mood Impairment (Depressive Signs/Symptoms)  Goal: Improved Mood Symptoms (Depressive Signs/Symptoms)  Outcome: Ongoing, Progressing     Problem: Nutrition Imbalance (Depressive Signs/Symptoms)  Goal:  Optimized Nutrition Intake  Outcome: Ongoing, Progressing     Problem: Psychomotor Impairment (Depressive Signs/Symptoms)  Goal: Improved Psychomotor Symptoms (Depressive Signs/Symptoms)  Outcome: Ongoing, Progressing     Problem: Sleep Disturbance (Depressive Signs/Symptoms)  Goal: Improved Sleep (Depressive Signs/Symptoms)  Outcome: Ongoing, Progressing     Problem: Social, Occupational or Functional Impairment (Depressive Signs/Symptoms)  Goal: Enhanced Social, Occupational or Functional Skills (Depressive Signs/Symptoms)  Outcome: Ongoing, Progressing     Problem: Activity and Energy Impairment (Excessive Substance Use)  Goal: Optimized Energy Level (Excessive Substance Use)  Outcome: Ongoing, Progressing     Problem: Behavior Regulation Impairment (Excessive Substance Use)  Goal: Improved Behavioral Control (Excessive Substance Use)  Outcome: Ongoing, Progressing     Problem: Decreased Participation and Engagement (Excessive Substance Use)  Goal: Increased Participation and Engagement (Excessive Substance Use)  Outcome: Ongoing, Progressing     Problem: Physiologic Impairment (Excessive Substance Use)  Goal: Improved Physiologic Symptoms (Excessive Substance Use)  Outcome: Ongoing, Progressing     Problem: Social, Occupational or Functional Impairment (Excessive Substance Use)  Goal: Enhanced Social, Occupational or Functional Skills (Excessive Substance Use)  Outcome: Ongoing, Progressing    She is AAO X 4. Sad affect and depressed mood. Anxious. Isolated and withdrawn, but interacts with selected pts and staff. She consumed all AM meds with no complaint. After consuming meds, she stated that she felt funny. Informed her this may be due to her being started on her Lithium again or the Fentanyl being excreted from her system. She voiced understanding. Continue plan of care and provide a safe/therapeutic mileu.

## 2023-02-14 NOTE — PLAN OF CARE
Problem: Behavior Regulation Impairment (Psychotic Signs/Symptoms)  Goal: Improved Behavioral Control (Psychotic Signs/Symptoms)  Outcome: Ongoing, Progressing     Problem: Cognitive Impairment (Psychotic Signs/Symptoms)  Goal: Optimal Cognitive Function (Psychotic Signs/Symptoms)  Outcome: Ongoing, Progressing     Problem: Decreased Participation and Engagement (Psychotic Signs/Symptoms)  Goal: Increased Participation and Engagement (Psychotic Signs/Symptoms)  Outcome: Ongoing, Progressing     Problem: Mood Impairment (Psychotic Signs/Symptoms)  Goal: Improved Mood Symptoms (Psychotic Signs/Symptoms)  Outcome: Ongoing, Progressing     Problem: Psychomotor Impairment (Psychotic Signs/Symptoms)  Goal: Improved Psychomotor Symptoms (Psychotic Signs/Symptoms)  Outcome: Ongoing, Progressing     Problem: Sensory Perception Impairment (Psychotic Signs/Symptoms)  Goal: Decreased Sensory Symptoms (Psychotic Signs/Symptoms)  Outcome: Ongoing, Progressing     Problem: Violence Risk or Actual  Goal: Anger and Impulse Control  Outcome: Ongoing, Progressing

## 2023-02-15 PROBLEM — F31.2 BIPOLAR AFFECTIVE DISORDER, MANIC, SEVERE, WITH PSYCHOTIC BEHAVIOR: Status: ACTIVE | Noted: 2023-02-15

## 2023-02-15 PROCEDURE — 25000003 PHARM REV CODE 250: Performed by: GENERAL ACUTE CARE HOSPITAL

## 2023-02-15 PROCEDURE — 12400001 HC PSYCH SEMI-PRIVATE ROOM

## 2023-02-15 PROCEDURE — 25000003 PHARM REV CODE 250

## 2023-02-15 PROCEDURE — 25000003 PHARM REV CODE 250: Performed by: PSYCHIATRY & NEUROLOGY

## 2023-02-15 RX ORDER — LITHIUM CARBONATE 300 MG/1
300 CAPSULE ORAL NIGHTLY
Qty: 30 CAPSULE | Refills: 0 | Status: SHIPPED | OUTPATIENT
Start: 2023-02-15 | End: 2024-02-15

## 2023-02-15 RX ORDER — DOXEPIN HYDROCHLORIDE 10 MG/1
10 CAPSULE ORAL NIGHTLY
Status: DISCONTINUED | OUTPATIENT
Start: 2023-02-15 | End: 2023-02-16 | Stop reason: HOSPADM

## 2023-02-15 RX ORDER — DOXEPIN HYDROCHLORIDE 10 MG/1
10 CAPSULE ORAL NIGHTLY
Qty: 30 CAPSULE | Refills: 0 | Status: SHIPPED | OUTPATIENT
Start: 2023-02-15 | End: 2024-02-15

## 2023-02-15 RX ORDER — CEPHALEXIN 500 MG/1
500 CAPSULE ORAL EVERY 8 HOURS
Qty: 6 CAPSULE | Refills: 0 | Status: SHIPPED | OUTPATIENT
Start: 2023-02-15 | End: 2023-02-17

## 2023-02-15 RX ORDER — BENZTROPINE MESYLATE 1 MG/1
1 TABLET ORAL DAILY
Qty: 30 TABLET | Refills: 0 | Status: SHIPPED | OUTPATIENT
Start: 2023-02-16 | End: 2024-02-16

## 2023-02-15 RX ORDER — LITHIUM CARBONATE 150 MG/1
150 CAPSULE ORAL DAILY
Qty: 30 CAPSULE | Refills: 0 | Status: SHIPPED | OUTPATIENT
Start: 2023-02-16 | End: 2024-02-16

## 2023-02-15 RX ADMIN — CEPHALEXIN 500 MG: 500 CAPSULE ORAL at 06:02

## 2023-02-15 RX ADMIN — DOXEPIN HYDROCHLORIDE 10 MG: 10 CAPSULE ORAL at 08:02

## 2023-02-15 RX ADMIN — LITHIUM CARBONATE 150 MG: 150 CAPSULE, GELATIN COATED ORAL at 08:02

## 2023-02-15 RX ADMIN — CEPHALEXIN 500 MG: 500 CAPSULE ORAL at 02:02

## 2023-02-15 RX ADMIN — CEPHALEXIN 500 MG: 500 CAPSULE ORAL at 08:02

## 2023-02-15 RX ADMIN — BENZTROPINE MESYLATE 1 MG: 1 TABLET ORAL at 08:02

## 2023-02-15 RX ADMIN — LITHIUM CARBONATE 300 MG: 150 CAPSULE, GELATIN COATED ORAL at 08:02

## 2023-02-15 NOTE — CARE UPDATE
Treatment Team    Pt seem for treatment team today with interdisciplinary team.  Pt is Cooperative with Tx team. Pt denies symptoms at this time. MD did not change pt meds at this time. Treatment teams goals Met at this time. Pt DC plan is home. DC date scheduled for 2.16.23.

## 2023-02-15 NOTE — NURSING
Treatment team today.  She reported mood is doing better and that she was able to sleep better last night with the doxepin. Pt will be ordered scheduled doxepin. She reports being calm. She is projected to be discharged tomorrow and will follow up with JOYCE. .  She will have a lithium level drawn in the AM. She is happy with current plan of care.

## 2023-02-15 NOTE — PLAN OF CARE
Problem: Behavior Regulation Impairment (Psychotic Signs/Symptoms)  Goal: Improved Behavioral Control (Psychotic Signs/Symptoms)  Outcome: Ongoing, Progressing     Problem: Cognitive Impairment (Psychotic Signs/Symptoms)  Goal: Optimal Cognitive Function (Psychotic Signs/Symptoms)  Outcome: Ongoing, Progressing     Problem: Decreased Participation and Engagement (Psychotic Signs/Symptoms)  Goal: Increased Participation and Engagement (Psychotic Signs/Symptoms)  Outcome: Ongoing, Progressing     Problem: Mood Impairment (Psychotic Signs/Symptoms)  Goal: Improved Mood Symptoms (Psychotic Signs/Symptoms)  Outcome: Ongoing, Progressing     Problem: Psychomotor Impairment (Psychotic Signs/Symptoms)  Goal: Improved Psychomotor Symptoms (Psychotic Signs/Symptoms)  Outcome: Ongoing, Progressing     Problem: Sensory Perception Impairment (Psychotic Signs/Symptoms)  Goal: Decreased Sensory Symptoms (Psychotic Signs/Symptoms)  Outcome: Ongoing, Progressing     Problem: Violence Risk or Actual  Goal: Anger and Impulse Control  Outcome: Ongoing, Progressing     Problem: Activity and Energy Impairment (Depressive Signs/Symptoms)  Goal: Optimized Energy Level (Depressive Signs/Symptoms)  Outcome: Ongoing, Progressing     Problem: Cognitive Impairment (Depressive Signs/Symptoms)  Goal: Optimized Cognitive Function  Outcome: Ongoing, Progressing     Problem: Decreased Participation/Engagement (Depressive Signs/Symptoms)  Goal: Increased Participation and Engagement (Depressive Signs/Symptoms)  Outcome: Ongoing, Progressing     Problem: Feelings of Worthlessness, Hopelessness or Excessive Guilt (Depressive Signs/Symptoms)  Goal: Enhanced Self-Esteem and Confidence (Depressive Signs/Symptoms)  Outcome: Ongoing, Progressing     Problem: Mood Impairment (Depressive Signs/Symptoms)  Goal: Improved Mood Symptoms (Depressive Signs/Symptoms)  Outcome: Ongoing, Progressing     Problem: Nutrition Imbalance (Depressive Signs/Symptoms)  Goal:  Optimized Nutrition Intake  Outcome: Ongoing, Progressing     Problem: Psychomotor Impairment (Depressive Signs/Symptoms)  Goal: Improved Psychomotor Symptoms (Depressive Signs/Symptoms)  Outcome: Ongoing, Progressing     Problem: Sleep Disturbance (Depressive Signs/Symptoms)  Goal: Improved Sleep (Depressive Signs/Symptoms)  Outcome: Ongoing, Progressing     Problem: Social, Occupational or Functional Impairment (Depressive Signs/Symptoms)  Goal: Enhanced Social, Occupational or Functional Skills (Depressive Signs/Symptoms)  Outcome: Ongoing, Progressing     Problem: Activity and Energy Impairment (Excessive Substance Use)  Goal: Optimized Energy Level (Excessive Substance Use)  Outcome: Ongoing, Progressing     Problem: Behavior Regulation Impairment (Excessive Substance Use)  Goal: Improved Behavioral Control (Excessive Substance Use)  Outcome: Ongoing, Progressing     Problem: Decreased Participation and Engagement (Excessive Substance Use)  Goal: Increased Participation and Engagement (Excessive Substance Use)  Outcome: Ongoing, Progressing     Problem: Physiologic Impairment (Excessive Substance Use)  Goal: Improved Physiologic Symptoms (Excessive Substance Use)  Outcome: Ongoing, Progressing     Problem: Social, Occupational or Functional Impairment (Excessive Substance Use)  Goal: Enhanced Social, Occupational or Functional Skills (Excessive Substance Use)  Outcome: Ongoing, Progressing    She is AAO X 4. Sad affect and depressed mood. SI with no plan. Auditory and visual hallucinations noted. She didn't state what she is hearing and seeing. Isolated and withdrawn, but interacts with selected pts and staff. Hopeless. Helpless. Good eye contact noted. Speech low and slowed. Continue plan of care and provide a safe/therapeutic mileu.

## 2023-02-15 NOTE — PROGRESS NOTES
2/15/2023 12:50 PM   Brit Baird   1995   [id number]        Psychiatry Progress Note     Chief Complaint: Harjinder    SUBJECTIVE:   Brit Baird is a 27 y.o. female placed under a PEC at [facility] for harjinder.  Followed by [facility] in [place] ([facility]  She reports that she slept well with doxepin last night.  She would prefer to have it scheduled.  No acute adverse reactions to medication.  Tolerating increase in Lithium.  Scheduled to have f/u lithium level in AM.  Due for discharge tomorrow and will proceed with this plan.    UDS: (+)fentanyl  Blood alcohol: <10  Lithium: <0.1       Current Medications:   Scheduled Meds:    benztropine  1 mg Oral Daily    cephALEXin  500 mg Oral Q8H    lithium carbonate  150 mg Oral Daily    lithium carbonate  300 mg Oral QHS      PRN Meds: acetaminophen, aluminum-magnesium hydroxide-simethicone, diphenhydrAMINE, doxepin, haloperidol lactate, hydrOXYzine HCL, lorazepam, magnesium hydroxide 400 mg/5 ml, ondansetron, promethazine   Psychotherapeutics (From admission, onward)      Start     Stop Route Frequency Ordered    02/14/23 0900  lithium capsule         -- Oral Daily 02/13/23 1307    02/13/23 2100  lithium capsule         -- Oral Nightly 02/13/23 1307    02/13/23 1405  doxepin capsule 10 mg         -- Oral Nightly PRN 02/13/23 1305    02/10/23 0527  LORazepam injection 2 mg        Question:  Is the patient competent?  Answer:  Yes    -- IM Every 4 hours PRN 02/10/23 0527    02/10/23 0527  haloperidol lactate injection 10 mg         -- IM Every 4 hours PRN 02/10/23 0527            Allergies:   Review of patient's allergies indicates:   Allergen Reactions    Ibuprofen         OBJECTIVE:   Vitals   Vitals:    02/14/23 0701   BP: 126/81   Pulse: 95   Resp: 18   Temp: 98.8 °F (37.1 °C)        Labs/Imaging/Studies:   No results found for this or any previous visit (from the past 36 hour(s)).       Medical Review Of Systems:  Constitutional: negative  Respiratory:  "negative  Cardiovascular: negative  Gastrointestinal: negative  Genitourinary:negative  Musculoskeletal:negative  Neurological: negative      Psychiatric Mental Status Exam:  General Appearance: appears stated age, well-developed, well-nourished  Arousal: alert  Behavior: cooperative  Movements and Motor Activity: no abnormal involuntary movements noted  Orientation: oriented to person, place, time, and situation  Speech: normal rate, normal rhythm, normal volume, normal tone  Mood: "Ok"  Affect: restricted  Thought Process: linear  Associations: intact  Thought Content and Perceptions: denies current recent hallucinations, no suicidal ideation, no homicidal ideation, no paranoid ideation, no ideas of reference  Recent and Remote Memory: recent memory intact, remote memory intact; per interview/observation with patient  Attention and Concentration: intact, attentive to conversation; per interview/observation with patient  Fund of Knowledge: intact, aware of current events, vocabulary appropriate; based on history, vocabulary, fund of knowledge, syntax, grammar, and content  Insight: intact; based on understanding of severity of illness and HPI  Judgment: questionable; based on patient's behavior and HPI      ASSESSMENT/PLAN:   Problems Addressed/Diagnoses:  Bipolar disorder, most recent episode manic, severe, with psychotic symptoms (F31.2)  Reports h/o Schizoaffective disorder    Past Medical History:   Diagnosis Date    Schizo affective schizophrenia         Plan:  -Labs: Lithium level in AM    Expected Disposition Plan: Home        Jeffrey Burch M.D.  "

## 2023-02-15 NOTE — PLAN OF CARE
Problem: Behavior Regulation Impairment (Psychotic Signs/Symptoms)  Goal: Improved Behavioral Control (Psychotic Signs/Symptoms)  Outcome: Ongoing, Progressing     Problem: Cognitive Impairment (Psychotic Signs/Symptoms)  Goal: Optimal Cognitive Function (Psychotic Signs/Symptoms)  Outcome: Ongoing, Progressing     Problem: Decreased Participation and Engagement (Psychotic Signs/Symptoms)  Goal: Increased Participation and Engagement (Psychotic Signs/Symptoms)  Outcome: Ongoing, Progressing     Problem: Mood Impairment (Psychotic Signs/Symptoms)  Goal: Improved Mood Symptoms (Psychotic Signs/Symptoms)  Outcome: Ongoing, Progressing     Problem: Psychomotor Impairment (Psychotic Signs/Symptoms)  Goal: Improved Psychomotor Symptoms (Psychotic Signs/Symptoms)  Outcome: Ongoing, Progressing     Problem: Sensory Perception Impairment (Psychotic Signs/Symptoms)  Goal: Decreased Sensory Symptoms (Psychotic Signs/Symptoms)  Outcome: Ongoing, Progressing     Problem: Violence Risk or Actual  Goal: Anger and Impulse Control  Outcome: Ongoing, Progressing     Problem: Cognitive Impairment (Depressive Signs/Symptoms)  Goal: Optimized Cognitive Function  Outcome: Ongoing, Progressing     Problem: Decreased Participation/Engagement (Depressive Signs/Symptoms)  Goal: Increased Participation and Engagement (Depressive Signs/Symptoms)  Outcome: Ongoing, Progressing     Problem: Feelings of Worthlessness, Hopelessness or Excessive Guilt (Depressive Signs/Symptoms)  Goal: Enhanced Self-Esteem and Confidence (Depressive Signs/Symptoms)  Outcome: Ongoing, Progressing     Problem: Mood Impairment (Depressive Signs/Symptoms)  Goal: Improved Mood Symptoms (Depressive Signs/Symptoms)  Outcome: Ongoing, Progressing     Problem: Nutrition Imbalance (Depressive Signs/Symptoms)  Goal: Optimized Nutrition Intake  Outcome: Ongoing, Progressing     Problem: Psychomotor Impairment (Depressive Signs/Symptoms)  Goal: Improved Psychomotor Symptoms  (Depressive Signs/Symptoms)  Outcome: Ongoing, Progressing     Problem: Sleep Disturbance (Depressive Signs/Symptoms)  Goal: Improved Sleep (Depressive Signs/Symptoms)  Outcome: Ongoing, Progressing     Problem: Social, Occupational or Functional Impairment (Depressive Signs/Symptoms)  Goal: Enhanced Social, Occupational or Functional Skills (Depressive Signs/Symptoms)  Outcome: Ongoing, Progressing     Problem: Activity and Energy Impairment (Excessive Substance Use)  Goal: Optimized Energy Level (Excessive Substance Use)  Outcome: Ongoing, Progressing     Problem: Behavior Regulation Impairment (Excessive Substance Use)  Goal: Improved Behavioral Control (Excessive Substance Use)  Outcome: Ongoing, Progressing     Problem: Decreased Participation and Engagement (Excessive Substance Use)  Goal: Increased Participation and Engagement (Excessive Substance Use)  Outcome: Ongoing, Progressing     Problem: Physiologic Impairment (Excessive Substance Use)  Goal: Improved Physiologic Symptoms (Excessive Substance Use)  Outcome: Ongoing, Progressing     Problem: Social, Occupational or Functional Impairment (Excessive Substance Use)  Goal: Enhanced Social, Occupational or Functional Skills (Excessive Substance Use)  Outcome: Ongoing, Progressing

## 2023-02-16 VITALS
RESPIRATION RATE: 18 BRPM | BODY MASS INDEX: 33.06 KG/M2 | WEIGHT: 205.69 LBS | HEART RATE: 109 BPM | HEIGHT: 66 IN | TEMPERATURE: 98 F | DIASTOLIC BLOOD PRESSURE: 87 MMHG | OXYGEN SATURATION: 100 % | SYSTOLIC BLOOD PRESSURE: 123 MMHG

## 2023-02-16 LAB — LITHIUM SERPL-MCNC: 0.23 MMOL/L (ref 0.5–1.2)

## 2023-02-16 PROCEDURE — 80178 ASSAY OF LITHIUM: CPT | Performed by: PSYCHIATRY & NEUROLOGY

## 2023-02-16 PROCEDURE — 25000003 PHARM REV CODE 250

## 2023-02-16 PROCEDURE — 25000003 PHARM REV CODE 250: Performed by: GENERAL ACUTE CARE HOSPITAL

## 2023-02-16 PROCEDURE — 25000003 PHARM REV CODE 250: Performed by: PSYCHIATRY & NEUROLOGY

## 2023-02-16 RX ADMIN — BENZTROPINE MESYLATE 1 MG: 1 TABLET ORAL at 08:02

## 2023-02-16 RX ADMIN — CEPHALEXIN 500 MG: 500 CAPSULE ORAL at 06:02

## 2023-02-16 RX ADMIN — LITHIUM CARBONATE 150 MG: 150 CAPSULE, GELATIN COATED ORAL at 08:02

## 2023-02-16 NOTE — PROGRESS NOTES
2/16/2023 12:50 PM   Brit Baird   1995   [id number]        Psychiatry Progress Note     Chief Complaint: Harjinder    SUBJECTIVE:   Brit Baird is a 27 y.o. female placed under a PEC at [facility] for harjinder.  Followed by JOYCE in [place] (Ricky, PMHNP).  Lithium level obtained this morning and we are awaiting results prior to DC.  Patient tolerating medications well. Patient mood today is happy. Patient denies any SI/HI or manic symptoms.     UDS: (+)fentanyl  Blood alcohol: <10  Lithium: <0.1       Current Medications:   Scheduled Meds:    benztropine  1 mg Oral Daily    cephALEXin  500 mg Oral Q8H    doxepin  10 mg Oral QHS    lithium carbonate  150 mg Oral Daily    lithium carbonate  300 mg Oral QHS      PRN Meds: acetaminophen, aluminum-magnesium hydroxide-simethicone, diphenhydrAMINE, haloperidol lactate, hydrOXYzine HCL, lorazepam, magnesium hydroxide 400 mg/5 ml, ondansetron, promethazine   Psychotherapeutics (From admission, onward)      Start     Stop Route Frequency Ordered    02/15/23 2100  doxepin capsule 10 mg         -- Oral Nightly 02/15/23 1335    02/14/23 0900  lithium capsule         -- Oral Daily 02/13/23 1307    02/13/23 2100  lithium capsule         -- Oral Nightly 02/13/23 1307    02/10/23 0527  LORazepam injection 2 mg        Question:  Is the patient competent?  Answer:  Yes    -- IM Every 4 hours PRN 02/10/23 0527    02/10/23 0527  haloperidol lactate injection 10 mg         -- IM Every 4 hours PRN 02/10/23 0527            Allergies:   Review of patient's allergies indicates:   Allergen Reactions    Ibuprofen         OBJECTIVE:   Vitals   Vitals:    02/15/23 0701   BP: 119/84   Pulse: 92   Resp: 17   Temp: 98.4 °F (36.9 °C)        Labs/Imaging/Studies:   No results found for this or any previous visit (from the past 36 hour(s)).       Medical Review Of Systems:  Constitutional: negative  Respiratory: negative  Cardiovascular: negative  Gastrointestinal:  "negative  Genitourinary:negative  Musculoskeletal:negative  Neurological: negative      Psychiatric Mental Status Exam:  General Appearance: appears stated age, well-developed, well-nourished  Arousal: alert  Behavior: cooperative  Movements and Motor Activity: no abnormal involuntary movements noted  Orientation: oriented to person, place, time, and situation  Speech: normal rate, normal rhythm, normal volume, normal tone  Mood: "Ok"  Affect: restricted  Thought Process: linear  Associations: intact  Thought Content and Perceptions: denies current recent hallucinations, no suicidal ideation, no homicidal ideation, no paranoid ideation, no ideas of reference  Recent and Remote Memory: recent memory intact, remote memory intact; per interview/observation with patient  Attention and Concentration: intact, attentive to conversation; per interview/observation with patient  Fund of Knowledge: intact, aware of current events, vocabulary appropriate; based on history, vocabulary, fund of knowledge, syntax, grammar, and content  Insight: intact; based on understanding of severity of illness and HPI  Judgment: questionable; based on patient's behavior and HPI      ASSESSMENT/PLAN:   Problems Addressed/Diagnoses:  Bipolar disorder, most recent episode manic, severe, with psychotic symptoms (F31.2)  Reports h/o Schizoaffective disorder    Past Medical History:   Diagnosis Date    Schizo affective schizophrenia         Plan:  -Plan for DC today     Expected Disposition Plan: Home        WIN Zuñiga  "

## 2023-02-16 NOTE — PLAN OF CARE
Problem: Behavior Regulation Impairment (Psychotic Signs/Symptoms)  Goal: Improved Behavioral Control (Psychotic Signs/Symptoms)  Outcome: Met     Problem: Cognitive Impairment (Psychotic Signs/Symptoms)  Goal: Optimal Cognitive Function (Psychotic Signs/Symptoms)  Outcome: Met     Problem: Decreased Participation and Engagement (Psychotic Signs/Symptoms)  Goal: Increased Participation and Engagement (Psychotic Signs/Symptoms)  Outcome: Met     Problem: Mood Impairment (Psychotic Signs/Symptoms)  Goal: Improved Mood Symptoms (Psychotic Signs/Symptoms)  Outcome: Met     Problem: Psychomotor Impairment (Psychotic Signs/Symptoms)  Goal: Improved Psychomotor Symptoms (Psychotic Signs/Symptoms)  Outcome: Met     Problem: Sensory Perception Impairment (Psychotic Signs/Symptoms)  Goal: Decreased Sensory Symptoms (Psychotic Signs/Symptoms)  Outcome: Met     Problem: Violence Risk or Actual  Goal: Anger and Impulse Control  Outcome: Met     Problem: Activity and Energy Impairment (Depressive Signs/Symptoms)  Goal: Optimized Energy Level (Depressive Signs/Symptoms)  Outcome: Met     Problem: Cognitive Impairment (Depressive Signs/Symptoms)  Goal: Optimized Cognitive Function  Outcome: Met     Problem: Decreased Participation/Engagement (Depressive Signs/Symptoms)  Goal: Increased Participation and Engagement (Depressive Signs/Symptoms)  Outcome: Met     Problem: Feelings of Worthlessness, Hopelessness or Excessive Guilt (Depressive Signs/Symptoms)  Goal: Enhanced Self-Esteem and Confidence (Depressive Signs/Symptoms)  Outcome: Met     Problem: Mood Impairment (Depressive Signs/Symptoms)  Goal: Improved Mood Symptoms (Depressive Signs/Symptoms)  Outcome: Met     Problem: Nutrition Imbalance (Depressive Signs/Symptoms)  Goal: Optimized Nutrition Intake  Outcome: Met     Problem: Psychomotor Impairment (Depressive Signs/Symptoms)  Goal: Improved Psychomotor Symptoms (Depressive Signs/Symptoms)  Outcome: Met     Problem: Sleep  Disturbance (Depressive Signs/Symptoms)  Goal: Improved Sleep (Depressive Signs/Symptoms)  Outcome: Met     Problem: Activity and Energy Impairment (Excessive Substance Use)  Goal: Optimized Energy Level (Excessive Substance Use)  Outcome: Met     Problem: Social, Occupational or Functional Impairment (Depressive Signs/Symptoms)  Goal: Enhanced Social, Occupational or Functional Skills (Depressive Signs/Symptoms)  Outcome: Met     Problem: Behavior Regulation Impairment (Excessive Substance Use)  Goal: Improved Behavioral Control (Excessive Substance Use)  Outcome: Met     Problem: Physiologic Impairment (Excessive Substance Use)  Goal: Improved Physiologic Symptoms (Excessive Substance Use)  Outcome: Met     Problem: Decreased Participation and Engagement (Excessive Substance Use)  Goal: Increased Participation and Engagement (Excessive Substance Use)  Outcome: Met     Problem: Social, Occupational or Functional Impairment (Excessive Substance Use)  Goal: Enhanced Social, Occupational or Functional Skills (Excessive Substance Use)  Outcome: Met

## 2023-02-16 NOTE — NURSING
Pt is scheduled for discharge today, pt is  Currently voicing no ADRs or physical complaints at this time, currently voicing no  Suicidal or homicidal ideations at this time, voicing no hallucinations or delusions at this  Time, voicing no detox symptoms at this time,  Pt was given dc instructions, voiced understanding, dc instructions given by GREG Alejandra, pt will receive a pkt with discharge instructions and aftercare appts. Pt states her  Mother will be picking her up today. Pt will receive any belongings she brought in, pt will be brought up front to dc area when transportation arrives.

## 2023-02-17 NOTE — DISCHARGE SUMMARY
"DISCHARGE SUMMARY  PSYCHIATRY      Admit Date: 2/10/2023  3:13 AM    Discharge Date:  2/16/2023    SITE:   [facility] *  [facility]    Discharge Attending Physician: Jeffrey Burch M.D.      History of Present Illness On Admit:   Brit Baird is a 27 y.o. female placed under a PEC at [facility] for harjinder. Patient states that she got into an argument with her father over issues that she understands did not warrant her outburst. She states that she has been treated for bipolar disorder and Schizoaffective disorder since 2018. Patient states that she has been well controlled on medication and this is the first manic episode of this degree. Patient states that she has been stable on her lithium but started taking a nutritional supplement called Wynk NU for PCOS and sleep. She also indicates a change in her birth control medication however this was approximately on year ago. Patient states that she has experienced changes in her sleep patterns and has not needed much sleep. She states that she finds herself dancing in the middle of the night to K-Pop music when she should be sleeping. She denies any current depression or thoughts of wanting to hurt herself or others. Will obtain a lithium level as she does not recall the last time this was checked. Will restart her home medications and adjust based on need and mood control.    UDS: (+)fentanyl  Blood alcohol: <10      Diagnoses:  PRINCIPAL PROBLEM: Bipolar affective disorder, manic, severe, with psychotic behavior    PROBLEM LIST    Bipolar affective disorder, manic, severe, with psychotic behavior  Reports h/o Schizoaffective disorder    Discharged Condition: Stable    Hospital Course:   Patient was admitted to [facility] and restarted on her home medications that included Lithium 150mg BID and Cogentin 1mg daily.  She reported that she felt like she may have been "poisoned" with fentanyl, which caused her to have a "terrible" " weekend.  She also reported that she believes that it may have been in a nutritional powder that she ordered off of the internet.  We discussed increasing lithium, to which she was amenable.  Trazodone was discontinued due to inefficacy and she was started on doxepin 10mg HS PRN.  Lithium was increased to 150mg daily and 300mg HS.  Lithium was <0.1 on 2/10/23 but increased to 0.23 on 2/16/23.  She had also been started on cephalexin for a UTI.  She tolerated these adjustments without issue.  Treatment compliant and was not a behavioral issues on the unit.  During the program course she was educated on the dynamic aspects of her disorder.  Individual and group psychotherapy sessions focused on disease process, symptom management, positive coping skills, healthy living skills, and leisure skills.  Nursing groups focused on medications and the importance of both medication and treatment compliance.  She was compliant with all ADLs.  Her sleep, appetite, energy levels, and motivation all continued to improve.  She achieved maximum benefit of inpatient hospitalization and at this level of functioning was discharged home.  At the time of discharge, no thoughts of self-harm or harm to others noted.  At the time of discharge no tremors, rigidity, extrapyramidal symptoms, or excessive sedation were noted.      Disposition: discharged to home    Follow-up: [facility]      DISCHARGE EXAMINATION    VITALS   Vitals:    02/13/23 1900 02/14/23 0701 02/15/23 0701 02/16/23 0728   BP: 119/83 126/81 119/84 123/87   BP Location: Left arm   Left arm   Patient Position: Sitting   Sitting   Pulse: 98 95 92 109   Resp: 18 18 17 18   Temp: 98.2 °F (36.8 °C) 98.8 °F (37.1 °C) 98.4 °F (36.9 °C) 98.1 °F (36.7 °C)   TempSrc: Oral   Oral   SpO2: 97% 96% 96% 100%   Weight:       Height:             General Appearance: appears stated age, well-developed, well-nourished  Arousal: alert  Behavior: cooperative  Movements and Motor Activity: no abnormal  "involuntary movements noted  Orientation: oriented to person, place, time, and situation  Speech: normal rate, normal rhythm, normal volume, normal tone  Mood: "Ok"  Affect: restricted  Thought Process: linear  Associations: intact  Thought Content and Perceptions: denies current recent hallucinations, no suicidal ideation, no homicidal ideation, no paranoid ideation, no ideas of reference  Recent and Remote Memory: recent memory intact, remote memory intact; per interview/observation with patient  Attention and Concentration: intact, attentive to conversation; per interview/observation with patient  Fund of Knowledge: intact, aware of current events, vocabulary appropriate; based on history, vocabulary, fund of knowledge, syntax, grammar, and content  Insight: intact; based on understanding of severity of illness and HPI  Judgment: questionable; based on patient's behavior and HPI      Medication Regimen:  No current facility-administered medications for this encounter.    Current Outpatient Medications:     benztropine (COGENTIN) 1 MG tablet, Take 1 tablet (1 mg total) by mouth once daily., Disp: 30 tablet, Rfl: 0    cephALEXin (KEFLEX) 500 MG capsule, Take 1 capsule (500 mg total) by mouth every 8 (eight) hours. for 2 days, Disp: 6 capsule, Rfl: 0    doxepin (SINEQUAN) 10 MG capsule, Take 1 capsule (10 mg total) by mouth every evening., Disp: 30 capsule, Rfl: 0    lithium carbonate (ESKALITH) 300 MG capsule, Take 1 capsule (300 mg total) by mouth every evening., Disp: 30 capsule, Rfl: 0    lithium carbonate 150 MG capsule, Take 1 capsule (150 mg total) by mouth once daily., Disp: 30 capsule, Rfl: 0      Patient Instructions:   Continue medication regimen as prescribed.    Disposition plan per  - see  notes for details.    Patient instructed to call 911 or present to emergency department if any of the following complications develop status post discharge: suicidality, homicidality, or " grave disability.     Total time spent discharging patient: <30 minutes      Jeffrey Burch M.D.

## 2023-02-27 ENCOUNTER — OFFICE VISIT (OUTPATIENT)
Dept: FAMILY MEDICINE | Facility: CLINIC | Age: 28
End: 2023-02-27
Payer: MEDICAID

## 2023-02-27 VITALS
HEART RATE: 103 BPM | BODY MASS INDEX: 32.66 KG/M2 | SYSTOLIC BLOOD PRESSURE: 125 MMHG | RESPIRATION RATE: 18 BRPM | HEIGHT: 66 IN | WEIGHT: 203.19 LBS | DIASTOLIC BLOOD PRESSURE: 86 MMHG

## 2023-02-27 DIAGNOSIS — Z09 FOLLOW-UP EXAM: Primary | ICD-10-CM

## 2023-02-27 DIAGNOSIS — E66.9 OBESITY (BMI 30-39.9): ICD-10-CM

## 2023-02-27 DIAGNOSIS — E28.2 PCOS (POLYCYSTIC OVARIAN SYNDROME): ICD-10-CM

## 2023-02-27 DIAGNOSIS — E78.5 HYPERLIPIDEMIA, UNSPECIFIED HYPERLIPIDEMIA TYPE: ICD-10-CM

## 2023-02-27 DIAGNOSIS — F20.9 SCHIZOPHRENIA, UNSPECIFIED TYPE: ICD-10-CM

## 2023-02-27 PROCEDURE — 3074F PR MOST RECENT SYSTOLIC BLOOD PRESSURE < 130 MM HG: ICD-10-PCS | Mod: CPTII,,, | Performed by: REGISTERED NURSE

## 2023-02-27 PROCEDURE — 1159F MED LIST DOCD IN RCRD: CPT | Mod: CPTII,,, | Performed by: REGISTERED NURSE

## 2023-02-27 PROCEDURE — 3079F DIAST BP 80-89 MM HG: CPT | Mod: CPTII,,, | Performed by: REGISTERED NURSE

## 2023-02-27 PROCEDURE — 3074F SYST BP LT 130 MM HG: CPT | Mod: CPTII,,, | Performed by: REGISTERED NURSE

## 2023-02-27 PROCEDURE — 99999 PR PBB SHADOW E&M-EST. PATIENT-LVL IV: CPT | Mod: PBBFAC,,, | Performed by: REGISTERED NURSE

## 2023-02-27 PROCEDURE — 99214 OFFICE O/P EST MOD 30 MIN: CPT | Mod: PBBFAC | Performed by: REGISTERED NURSE

## 2023-02-27 PROCEDURE — 3008F PR BODY MASS INDEX (BMI) DOCUMENTED: ICD-10-PCS | Mod: CPTII,,, | Performed by: REGISTERED NURSE

## 2023-02-27 PROCEDURE — 1111F PR DISCHARGE MEDS RECONCILED W/ CURRENT OUTPATIENT MED LIST: ICD-10-PCS | Mod: CPTII,,, | Performed by: REGISTERED NURSE

## 2023-02-27 PROCEDURE — 99999 PR PBB SHADOW E&M-EST. PATIENT-LVL IV: ICD-10-PCS | Mod: PBBFAC,,, | Performed by: REGISTERED NURSE

## 2023-02-27 PROCEDURE — 3008F BODY MASS INDEX DOCD: CPT | Mod: CPTII,,, | Performed by: REGISTERED NURSE

## 2023-02-27 PROCEDURE — 99214 OFFICE O/P EST MOD 30 MIN: CPT | Mod: S$PBB,,, | Performed by: REGISTERED NURSE

## 2023-02-27 PROCEDURE — 99214 PR OFFICE/OUTPT VISIT, EST, LEVL IV, 30-39 MIN: ICD-10-PCS | Mod: S$PBB,,, | Performed by: REGISTERED NURSE

## 2023-02-27 PROCEDURE — 1111F DSCHRG MED/CURRENT MED MERGE: CPT | Mod: CPTII,,, | Performed by: REGISTERED NURSE

## 2023-02-27 PROCEDURE — 3079F PR MOST RECENT DIASTOLIC BLOOD PRESSURE 80-89 MM HG: ICD-10-PCS | Mod: CPTII,,, | Performed by: REGISTERED NURSE

## 2023-02-27 PROCEDURE — 1159F PR MEDICATION LIST DOCUMENTED IN MEDICAL RECORD: ICD-10-PCS | Mod: CPTII,,, | Performed by: REGISTERED NURSE

## 2023-02-27 RX ORDER — PALIPERIDONE PALMITATE 234 MG/1.5ML
INJECTION INTRAMUSCULAR
COMMUNITY
End: 2023-05-31

## 2023-02-27 RX ORDER — LORATADINE 10 MG/1
TABLET ORAL
COMMUNITY

## 2023-02-27 RX ORDER — FENOFIBRATE 134 MG/1
CAPSULE ORAL
COMMUNITY

## 2023-02-27 NOTE — PROGRESS NOTES
Subjective:       Patient ID: Brit Baird is a 27 y.o. female.    Chief Complaint: Follow-up (3 month f/u/BC isnt clearing up acne)    Patient here for follow-up.  Patient was PEC'd on 02/09 for harjinder and psychosis, and admitted to [facility] unit and discharged on 02/16.  Patient denies SI/HI, stated she is doing well on current medication regimen.  Patient requesting to change OCP's at this time, patient complaining of increased facial acne.  No other complaints at this time.  Review of Systems   Constitutional:  Negative for chills, fatigue and fever.   Integumentary:         +acne   Psychiatric/Behavioral:  Negative for dysphoric mood, self-injury and suicidal ideas. The patient is not nervous/anxious.    All other systems reviewed and are negative.      Objective:      Physical Exam  Vitals reviewed.   Constitutional:       Appearance: Normal appearance. She is obese.   HENT:      Head: Normocephalic and atraumatic.      Right Ear: Tympanic membrane normal.      Left Ear: Tympanic membrane normal.      Nose: Nose normal.      Mouth/Throat:      Mouth: Mucous membranes are moist.   Eyes:      Pupils: Pupils are equal, round, and reactive to light.   Cardiovascular:      Rate and Rhythm: Normal rate and regular rhythm.      Pulses: Normal pulses.      Heart sounds: Normal heart sounds.   Pulmonary:      Effort: Pulmonary effort is normal.      Breath sounds: Normal breath sounds.   Abdominal:      General: Abdomen is flat. Bowel sounds are normal.      Palpations: Abdomen is soft.   Musculoskeletal:         General: Normal range of motion.      Cervical back: Normal range of motion and neck supple.   Skin:     General: Skin is warm.      Capillary Refill: Capillary refill takes less than 2 seconds.      Findings: Acne present.   Neurological:      General: No focal deficit present.      Mental Status: She is alert and oriented to person, place, and time.   Psychiatric:         Mood and Affect: Mood  normal.         Behavior: Behavior normal.         Thought Content: Thought content normal.         Judgment: Judgment normal.       Assessment:       Problem List Items Addressed This Visit          Psychiatric    Schizophrenia       Cardiac/Vascular    Hyperlipidemia       Endocrine    PCOS (polycystic ovarian syndrome)    Relevant Medications    norethindrone-ethinyl estradiol (ORTHO-NOVUM 1-35 TAB,NORTREL 1-35 TAB) 1-35 mg-mcg per tablet       Other    Follow-up exam - Primary     Other Visit Diagnoses       Obesity (BMI 30-39.9)            I spent a total of 30 minutes on the day of the visit.This includes face to face time and non-face to face time preparing to see the patient (eg, review of tests), obtaining and/or reviewing separately obtained history, documenting clinical information in the electronic or other health record, independently interpreting results and communicating results to the patient/family/caregiver, or care coordinator.       Plan:   Obesity-Body mass index is 32.8 kg/m². Morbid obesity complicates all aspects of disease management from diagnostic modalities to treatment. Weight loss encouraged and health benefits explained to patient.      PCOS-continue OCPs as prescribed.    Hyperlipidemia-low-cholesterol/low-fat diet discussed, patient instructed to increase physical activity to 30 minutes most days.  Lipid panel to be drawn prior to next visit in 3 months.    Return to clinic in 3 months, lipid panel to be drawn prior to visit.

## 2023-05-29 DIAGNOSIS — Z00.00 WELLNESS EXAMINATION: Primary | ICD-10-CM

## 2023-05-29 PROBLEM — Z09 FOLLOW-UP EXAM: Status: RESOLVED | Noted: 2023-02-27 | Resolved: 2023-05-29

## 2023-05-30 ENCOUNTER — LAB VISIT (OUTPATIENT)
Dept: LAB | Facility: HOSPITAL | Age: 28
End: 2023-05-30
Attending: REGISTERED NURSE
Payer: MEDICAID

## 2023-05-30 DIAGNOSIS — E78.5 HYPERLIPIDEMIA, UNSPECIFIED HYPERLIPIDEMIA TYPE: ICD-10-CM

## 2023-05-30 DIAGNOSIS — Z00.00 WELLNESS EXAMINATION: ICD-10-CM

## 2023-05-30 LAB
CHOLEST SERPL-MCNC: 204 MG/DL
CHOLEST/HDLC SERPL: 5 {RATIO} (ref 0–5)
HDLC SERPL-MCNC: 43 MG/DL (ref 35–60)
LDLC SERPL CALC-MCNC: 115 MG/DL (ref 50–140)
TRIGL SERPL-MCNC: 232 MG/DL (ref 37–140)
VLDLC SERPL CALC-MCNC: 46 MG/DL

## 2023-05-30 PROCEDURE — 36415 COLL VENOUS BLD VENIPUNCTURE: CPT

## 2023-05-30 PROCEDURE — 87389 HIV-1 AG W/HIV-1&-2 AB AG IA: CPT

## 2023-05-30 PROCEDURE — 80061 LIPID PANEL: CPT

## 2023-05-30 PROCEDURE — 86803 HEPATITIS C AB TEST: CPT

## 2023-05-31 ENCOUNTER — OFFICE VISIT (OUTPATIENT)
Dept: FAMILY MEDICINE | Facility: CLINIC | Age: 28
End: 2023-05-31
Payer: MEDICAID

## 2023-05-31 VITALS
WEIGHT: 200.19 LBS | BODY MASS INDEX: 32.17 KG/M2 | HEIGHT: 66 IN | TEMPERATURE: 97 F | DIASTOLIC BLOOD PRESSURE: 81 MMHG | HEART RATE: 98 BPM | OXYGEN SATURATION: 96 % | RESPIRATION RATE: 18 BRPM | SYSTOLIC BLOOD PRESSURE: 126 MMHG

## 2023-05-31 DIAGNOSIS — F20.9 SCHIZOPHRENIA, UNSPECIFIED TYPE: ICD-10-CM

## 2023-05-31 DIAGNOSIS — F31.9 BIPOLAR 1 DISORDER: ICD-10-CM

## 2023-05-31 DIAGNOSIS — E78.2 MIXED HYPERLIPIDEMIA: ICD-10-CM

## 2023-05-31 DIAGNOSIS — Z00.00 WELLNESS EXAMINATION: Primary | ICD-10-CM

## 2023-05-31 DIAGNOSIS — E66.9 OBESITY (BMI 30-39.9): ICD-10-CM

## 2023-05-31 LAB
HCV AB SERPL QL IA: NONREACTIVE
HIV 1+2 AB+HIV1 P24 AG SERPL QL IA: NONREACTIVE

## 2023-05-31 PROCEDURE — 99215 PR OFFICE/OUTPT VISIT, EST, LEVL V, 40-54 MIN: ICD-10-PCS | Mod: S$PBB,,, | Performed by: REGISTERED NURSE

## 2023-05-31 PROCEDURE — 3079F DIAST BP 80-89 MM HG: CPT | Mod: CPTII,,, | Performed by: REGISTERED NURSE

## 2023-05-31 PROCEDURE — 1160F PR REVIEW ALL MEDS BY PRESCRIBER/CLIN PHARMACIST DOCUMENTED: ICD-10-PCS | Mod: CPTII,,, | Performed by: REGISTERED NURSE

## 2023-05-31 PROCEDURE — 3008F BODY MASS INDEX DOCD: CPT | Mod: CPTII,,, | Performed by: REGISTERED NURSE

## 2023-05-31 PROCEDURE — 3074F SYST BP LT 130 MM HG: CPT | Mod: CPTII,,, | Performed by: REGISTERED NURSE

## 2023-05-31 PROCEDURE — 99999 PR PBB SHADOW E&M-EST. PATIENT-LVL V: ICD-10-PCS | Mod: PBBFAC,,, | Performed by: REGISTERED NURSE

## 2023-05-31 PROCEDURE — 3074F PR MOST RECENT SYSTOLIC BLOOD PRESSURE < 130 MM HG: ICD-10-PCS | Mod: CPTII,,, | Performed by: REGISTERED NURSE

## 2023-05-31 PROCEDURE — 1159F MED LIST DOCD IN RCRD: CPT | Mod: CPTII,,, | Performed by: REGISTERED NURSE

## 2023-05-31 PROCEDURE — 99999 PR PBB SHADOW E&M-EST. PATIENT-LVL V: CPT | Mod: PBBFAC,,, | Performed by: REGISTERED NURSE

## 2023-05-31 PROCEDURE — 1160F RVW MEDS BY RX/DR IN RCRD: CPT | Mod: CPTII,,, | Performed by: REGISTERED NURSE

## 2023-05-31 PROCEDURE — 99215 OFFICE O/P EST HI 40 MIN: CPT | Mod: PBBFAC | Performed by: REGISTERED NURSE

## 2023-05-31 PROCEDURE — 1159F PR MEDICATION LIST DOCUMENTED IN MEDICAL RECORD: ICD-10-PCS | Mod: CPTII,,, | Performed by: REGISTERED NURSE

## 2023-05-31 PROCEDURE — 3008F PR BODY MASS INDEX (BMI) DOCUMENTED: ICD-10-PCS | Mod: CPTII,,, | Performed by: REGISTERED NURSE

## 2023-05-31 PROCEDURE — 99215 OFFICE O/P EST HI 40 MIN: CPT | Mod: S$PBB,,, | Performed by: REGISTERED NURSE

## 2023-05-31 PROCEDURE — 3079F PR MOST RECENT DIASTOLIC BLOOD PRESSURE 80-89 MM HG: ICD-10-PCS | Mod: CPTII,,, | Performed by: REGISTERED NURSE

## 2023-05-31 RX ORDER — PALIPERIDONE PALMITATE 546 MG/1.75ML
1.75 INJECTION, SUSPENSION, EXTENDED RELEASE INTRAMUSCULAR
COMMUNITY
Start: 2023-03-08

## 2023-05-31 RX ORDER — FLUTICASONE PROPIONATE 50 MCG
1 SPRAY, SUSPENSION (ML) NASAL DAILY
COMMUNITY
Start: 2023-05-24

## 2023-05-31 NOTE — PROGRESS NOTES
Subjective     Patient ID: Brit Baird is a 28 y.o. female.    Chief Complaint: Follow-up (Three month follow up with lab/)    28-year-old established patient here today for wellness exam.  Patient has history of schizophrenia, bipolar,hyperlipidemia, and PCOS. Patient stated she stopped taking fenofibrate and some of her psych meds due to nausea, patient denies SI/HI at this time, denies auditory/visual hallucinations.    Follow-up  Pertinent negatives include no chest pain or coughing.   Review of Systems   Respiratory:  Negative for cough and shortness of breath.    Cardiovascular:  Negative for chest pain and leg swelling.   Psychiatric/Behavioral:  Positive for dysphoric mood. Negative for self-injury, sleep disturbance and suicidal ideas. The patient is nervous/anxious.         Objective     Physical Exam  Vitals and nursing note reviewed.   Constitutional:       Appearance: Normal appearance. She is obese.   HENT:      Head: Normocephalic and atraumatic.      Right Ear: Tympanic membrane normal.      Left Ear: Tympanic membrane normal.      Nose: Nose normal.      Mouth/Throat:      Mouth: Mucous membranes are moist.   Eyes:      Extraocular Movements: Extraocular movements intact.      Conjunctiva/sclera: Conjunctivae normal.      Pupils: Pupils are equal, round, and reactive to light.   Cardiovascular:      Rate and Rhythm: Normal rate and regular rhythm.      Pulses: Normal pulses.      Heart sounds: Normal heart sounds.   Pulmonary:      Effort: Pulmonary effort is normal.      Breath sounds: Normal breath sounds.   Abdominal:      General: Abdomen is flat. Bowel sounds are normal.      Palpations: Abdomen is soft.   Musculoskeletal:         General: Normal range of motion.      Cervical back: Normal range of motion and neck supple.   Skin:     General: Skin is warm.      Capillary Refill: Capillary refill takes less than 2 seconds.   Neurological:      General: No focal deficit present.      Mental  Status: She is alert and oriented to person, place, and time.   Psychiatric:         Attention and Perception: She does not perceive auditory or visual hallucinations.         Mood and Affect: Mood is anxious.         Speech: Speech is rapid and pressured.         Behavior: Behavior normal. Behavior is cooperative.         Thought Content: Thought content normal. Thought content does not include homicidal or suicidal ideation. Thought content does not include homicidal or suicidal plan.         Cognition and Memory: Cognition and memory normal.         Judgment: Judgment normal.          Assessment and Plan     1. Wellness examination-healthy lifestyle discussed with patient, labs reviewed    2. Mixed hyperlipidemia-low-fat/low-cholesterol diet discussed, patient instructed to take fenofibrate as directed    3. Bipolar 1 disorder-continue current medication regimen, keep next scheduled appointment with mental health provider, report to ED with any SI/HI    4. Schizophrenia, unspecified type-continue current medication regimen, keep next scheduled appointment with mental health provider, report to ED with any SI/HI    5. Obesity-  Body mass index is 32.31 kg/m². Morbid obesity complicates all aspects of disease management from diagnostic modalities to treatment. Weight loss encouraged and health benefits explained to patient.      Return to clinic in 6 months for follow-up

## 2023-09-04 PROBLEM — Z00.00 WELLNESS EXAMINATION: Status: RESOLVED | Noted: 2022-11-18 | Resolved: 2023-09-04

## 2023-11-27 ENCOUNTER — HOSPITAL ENCOUNTER (OUTPATIENT)
Dept: RADIOLOGY | Facility: HOSPITAL | Age: 28
Discharge: HOME | End: 2023-11-27
Attending: NURSE PRACTITIONER
Payer: MEDICAID

## 2023-11-27 DIAGNOSIS — M54.50 LUMBAGO: ICD-10-CM

## 2023-11-27 PROCEDURE — 72040 X-RAY EXAM NECK SPINE 2-3 VW: CPT | Mod: TC

## 2023-11-27 PROCEDURE — 72100 X-RAY EXAM L-S SPINE 2/3 VWS: CPT | Mod: TC

## 2023-11-27 PROCEDURE — 72070 X-RAY EXAM THORAC SPINE 2VWS: CPT | Mod: TC

## 2024-08-23 ENCOUNTER — LAB VISIT (OUTPATIENT)
Dept: LAB | Facility: HOSPITAL | Age: 29
End: 2024-08-23
Attending: PLASTIC SURGERY
Payer: MEDICAID

## 2024-08-23 DIAGNOSIS — Z01.818 PREOPERATIVE TESTING: Primary | ICD-10-CM

## 2024-08-23 LAB
ALBUMIN SERPL-MCNC: 4.3 G/DL (ref 3.5–5)
ALBUMIN/GLOB SERPL: 1.7 RATIO (ref 1.1–2)
ALP SERPL-CCNC: 31 UNIT/L (ref 40–150)
ALT SERPL-CCNC: 97 UNIT/L (ref 0–55)
ANION GAP SERPL CALC-SCNC: 5 MEQ/L
AST SERPL-CCNC: 38 UNIT/L (ref 5–34)
BILIRUB SERPL-MCNC: 0.2 MG/DL
BUN SERPL-MCNC: 10 MG/DL (ref 7–18.7)
CALCIUM SERPL-MCNC: 9.3 MG/DL (ref 8.4–10.2)
CHLORIDE SERPL-SCNC: 113 MMOL/L (ref 98–107)
CO2 SERPL-SCNC: 22 MMOL/L (ref 22–29)
CREAT SERPL-MCNC: 0.75 MG/DL (ref 0.55–1.02)
CREAT/UREA NIT SERPL: 13
ERYTHROCYTE [DISTWIDTH] IN BLOOD BY AUTOMATED COUNT: 12.3 % (ref 11.5–17)
GFR SERPLBLD CREATININE-BSD FMLA CKD-EPI: >60 ML/MIN/1.73/M2
GLOBULIN SER-MCNC: 2.6 GM/DL (ref 2.4–3.5)
GLUCOSE SERPL-MCNC: 112 MG/DL (ref 74–100)
HCT VFR BLD AUTO: 41.2 % (ref 37–47)
HGB BLD-MCNC: 14.2 G/DL (ref 12–16)
MCH RBC QN AUTO: 30.5 PG (ref 27–31)
MCHC RBC AUTO-ENTMCNC: 34.5 G/DL (ref 33–36)
MCV RBC AUTO: 88.6 FL (ref 80–94)
PLATELET # BLD AUTO: 191 X10(3)/MCL (ref 130–400)
PMV BLD AUTO: 10.1 FL (ref 7.4–10.4)
POTASSIUM SERPL-SCNC: 4.2 MMOL/L (ref 3.5–5.1)
PROT SERPL-MCNC: 6.9 GM/DL (ref 6.4–8.3)
RBC # BLD AUTO: 4.65 X10(6)/MCL (ref 4.2–5.4)
SODIUM SERPL-SCNC: 140 MMOL/L (ref 136–145)
WBC # BLD AUTO: 5.15 X10(3)/MCL (ref 4.5–11.5)

## 2024-08-23 PROCEDURE — 85027 COMPLETE CBC AUTOMATED: CPT

## 2024-08-23 PROCEDURE — 80053 COMPREHEN METABOLIC PANEL: CPT

## 2024-08-23 PROCEDURE — 36415 COLL VENOUS BLD VENIPUNCTURE: CPT

## 2024-08-29 ENCOUNTER — HOSPITAL ENCOUNTER (OUTPATIENT)
Dept: RADIOLOGY | Facility: HOSPITAL | Age: 29
Discharge: HOME | End: 2024-08-29
Attending: NURSE PRACTITIONER
Payer: MEDICAID

## 2024-08-29 ENCOUNTER — CLINICAL SUPPORT (OUTPATIENT)
Dept: RESPIRATORY THERAPY | Facility: HOSPITAL | Age: 29
End: 2024-08-29
Attending: NURSE PRACTITIONER
Payer: MEDICAID

## 2024-08-29 DIAGNOSIS — Z01.811 PRE-OP CHEST EXAM: ICD-10-CM

## 2024-08-29 DIAGNOSIS — Z01.818 OTHER SPECIFIED PRE-OPERATIVE EXAMINATION: Primary | ICD-10-CM

## 2024-08-29 DIAGNOSIS — Z01.818 OTHER SPECIFIED PRE-OPERATIVE EXAMINATION: ICD-10-CM

## 2024-08-29 PROCEDURE — 93005 ELECTROCARDIOGRAM TRACING: CPT

## 2024-08-29 PROCEDURE — 93010 ELECTROCARDIOGRAM REPORT: CPT | Mod: ,,, | Performed by: INTERNAL MEDICINE

## 2024-08-29 PROCEDURE — 71046 X-RAY EXAM CHEST 2 VIEWS: CPT | Mod: TC

## 2024-08-30 LAB
OHS QRS DURATION: 84 MS
OHS QTC CALCULATION: 435 MS

## 2024-10-29 ENCOUNTER — LAB VISIT (OUTPATIENT)
Dept: LAB | Facility: HOSPITAL | Age: 29
End: 2024-10-29
Attending: INTERNAL MEDICINE
Payer: MEDICAID

## 2024-10-29 DIAGNOSIS — E78.1 PURE HYPERGLYCERIDEMIA: Primary | ICD-10-CM

## 2024-10-29 LAB
CHOLEST SERPL-MCNC: 203 MG/DL
CHOLEST/HDLC SERPL: 6 {RATIO} (ref 0–5)
HDLC SERPL-MCNC: 36 MG/DL (ref 35–60)
LDLC SERPL CALC-MCNC: 113 MG/DL (ref 50–140)
TRIGL SERPL-MCNC: 268 MG/DL (ref 37–140)
VLDLC SERPL CALC-MCNC: 54 MG/DL

## 2024-10-29 PROCEDURE — 80061 LIPID PANEL: CPT

## 2024-10-29 PROCEDURE — 36415 COLL VENOUS BLD VENIPUNCTURE: CPT

## 2024-11-07 ENCOUNTER — OFFICE VISIT (OUTPATIENT)
Dept: OBSTETRICS AND GYNECOLOGY | Facility: CLINIC | Age: 29
End: 2024-11-07
Payer: MEDICAID

## 2024-11-07 VITALS
WEIGHT: 215 LBS | RESPIRATION RATE: 16 BRPM | SYSTOLIC BLOOD PRESSURE: 117 MMHG | TEMPERATURE: 97 F | HEIGHT: 66 IN | OXYGEN SATURATION: 97 % | HEART RATE: 80 BPM | BODY MASS INDEX: 34.55 KG/M2 | DIASTOLIC BLOOD PRESSURE: 80 MMHG

## 2024-11-07 DIAGNOSIS — Z30.011 ENCOUNTER FOR INITIAL PRESCRIPTION OF CONTRACEPTIVE PILLS: ICD-10-CM

## 2024-11-07 DIAGNOSIS — N92.6 IRREGULAR MENSES: ICD-10-CM

## 2024-11-07 DIAGNOSIS — Z87.42 HISTORY OF PCOS: Primary | ICD-10-CM

## 2024-11-07 LAB
B-HCG UR QL: NEGATIVE
CTP QC/QA: YES

## 2024-11-07 PROCEDURE — 99999 PR PBB SHADOW E&M-EST. PATIENT-LVL IV: CPT | Mod: PBBFAC,,,

## 2024-11-07 PROCEDURE — 99214 OFFICE O/P EST MOD 30 MIN: CPT | Mod: PBBFAC,PN

## 2024-11-07 PROCEDURE — 99999PBSHW POCT URINE PREGNANCY: Mod: PBBFAC,,,

## 2024-11-07 PROCEDURE — 81025 URINE PREGNANCY TEST: CPT | Mod: PBBFAC,PN

## 2024-11-07 RX ORDER — TRAZODONE HYDROCHLORIDE 100 MG/1
100 TABLET ORAL NIGHTLY
COMMUNITY

## 2024-11-07 RX ORDER — DROSPIRENONE AND ESTETROL 3-14.2(28)
1 KIT ORAL DAILY
Qty: 28 TABLET | Refills: 11 | Status: SHIPPED | OUTPATIENT
Start: 2024-11-07

## 2024-11-07 RX ORDER — METFORMIN HYDROCHLORIDE 500 MG/1
500 TABLET ORAL 2 TIMES DAILY WITH MEALS
Qty: 60 TABLET | Refills: 11 | Status: SHIPPED | OUTPATIENT
Start: 2024-11-07 | End: 2025-11-07

## 2024-11-08 ENCOUNTER — LAB VISIT (OUTPATIENT)
Dept: LAB | Facility: HOSPITAL | Age: 29
End: 2024-11-08
Attending: PLASTIC SURGERY
Payer: MEDICAID

## 2024-11-08 DIAGNOSIS — Z01.818 OTHER SPECIFIED PRE-OPERATIVE EXAMINATION: Primary | ICD-10-CM

## 2024-11-08 LAB
ALBUMIN SERPL-MCNC: 4.7 G/DL (ref 3.5–5)
ALBUMIN/GLOB SERPL: 1.6 RATIO (ref 1.1–2)
ALP SERPL-CCNC: 37 UNIT/L (ref 40–150)
ALT SERPL-CCNC: 127 UNIT/L (ref 0–55)
ANION GAP SERPL CALC-SCNC: 9 MEQ/L
AST SERPL-CCNC: 46 UNIT/L (ref 5–34)
BILIRUB SERPL-MCNC: 0.4 MG/DL
BUN SERPL-MCNC: 9 MG/DL (ref 7–18.7)
CALCIUM SERPL-MCNC: 10.2 MG/DL (ref 8.4–10.2)
CHLORIDE SERPL-SCNC: 109 MMOL/L (ref 98–107)
CO2 SERPL-SCNC: 20 MMOL/L (ref 22–29)
CREAT SERPL-MCNC: 0.77 MG/DL (ref 0.55–1.02)
CREAT/UREA NIT SERPL: 12
ERYTHROCYTE [DISTWIDTH] IN BLOOD BY AUTOMATED COUNT: 12 % (ref 11.5–17)
GFR SERPLBLD CREATININE-BSD FMLA CKD-EPI: >60 ML/MIN/1.73/M2
GLOBULIN SER-MCNC: 3 GM/DL (ref 2.4–3.5)
GLUCOSE SERPL-MCNC: 93 MG/DL (ref 74–100)
HCT VFR BLD AUTO: 41.8 % (ref 37–47)
HGB BLD-MCNC: 14.9 G/DL (ref 12–16)
MCH RBC QN AUTO: 30 PG (ref 27–31)
MCHC RBC AUTO-ENTMCNC: 35.6 G/DL (ref 33–36)
MCV RBC AUTO: 84.1 FL (ref 80–94)
PLATELET # BLD AUTO: 245 X10(3)/MCL (ref 130–400)
PMV BLD AUTO: 9.6 FL (ref 7.4–10.4)
POTASSIUM SERPL-SCNC: 3.9 MMOL/L (ref 3.5–5.1)
PROT SERPL-MCNC: 7.7 GM/DL (ref 6.4–8.3)
RBC # BLD AUTO: 4.97 X10(6)/MCL (ref 4.2–5.4)
SODIUM SERPL-SCNC: 138 MMOL/L (ref 136–145)
WBC # BLD AUTO: 5.6 X10(3)/MCL (ref 4.5–11.5)

## 2024-11-08 PROCEDURE — 36415 COLL VENOUS BLD VENIPUNCTURE: CPT

## 2024-11-08 PROCEDURE — 80053 COMPREHEN METABOLIC PANEL: CPT

## 2024-11-08 PROCEDURE — 85027 COMPLETE CBC AUTOMATED: CPT

## 2024-11-12 NOTE — PROGRESS NOTES
Chief Complaint:  Contraception    History of Present Illness:  Patient is a 29 y.o.  presents to clinic to discuss contraception for management of PCOS.  Patient states it has been quite a few years she has had a workup.  Patient states periods are very irregular coming only every few months and are typically heavy in flow. Pt states she was managed with OCP and metformin in the past and would like to restart this regimen.  Patient has never been sexually active.        GYN HISTORY CYCLIC  LMP:   Frequency: irregular  Cycle length: 6-7 days  Flow: heavy  Intermenstrual bleeding: No  Postcoital bleeding: No  Dysmenorrhea: No  Sexually active: no  Dyspareunia: No  Contraception: none  H/o STI: No   Last pap:   H/o abnl pap: No  Gardasil: 3/3           Review of systems:  General/Constitutional: Chills denies. Fatigue/weakness denies. Fever denies. Night sweats denies. Hot flashes denies  Breast: Dimpling denies. Breast mass denies. Breast pain/tenderness denies. Nipple discharge denies. Puckering denies.  Gastrointestinal: Abdominal pain denies. Blood in stool denies. Constipation denies. Diarrhea denies. Heartburn denies. Nausea denies. Vomiting denies   Genitourinary: Incontinence denies. Blood in urine denies. Frequent urination denies. Urgency denies. Painful urination denies. Nocturia denies   Gynecologic: Irregular menses admits. Heavy bleeding denies. Painful menses denies. Vaginal discharge denies. Vaginal odor denies. Vaginal itching/Irritation denies. Vaginal lesion denies.  Pelvic pain denies. Decreased libido denies. Vulvar lesion denies. Prolapse of genital organs denies. Painful intercourse denies. Postcoital bleeding denies   Psychiatric: Mood lability denies. Depressed mood denies. Suicidal thoughts denies. Anxiety denies. Overwhelmed denies. Appetite normal. Energy level normal.     OB History    Para Term  AB Living   0 0 0 0 0 0   SAB IAB Ectopic Multiple Live Births    0 0 0 0 0   Obstetric Comments   GYN HISTORY CYCLIC   LMP: 2023   Frequency: irregular   Cycle length: 6-7 days   Flow: heavy   Intermenstrual bleeding: No   Postcoital bleeding: No   Dysmenorrhea: No   Sexually active: no   Dyspareunia: No   Contraception: none   H/o STI: No    Last pap: 2022   H/o abnl pap: No   Gardasil: 3/3      The patient has never been pregnant.    Past Medical History:   Diagnosis Date    Bipolar disorder, unspecified     Hyperlipidemia     PCOS (polycystic ovarian syndrome)     Schizo affective schizophrenia        Current Outpatient Medications:     fenofibrate micronized (LOFIBRA) 134 MG Cap, 1 capsule with a meal Orally Once a day for 30 days, Disp: , Rfl:     INVEGA TRINZA 546 mg/1.75 mL Syrg, Inject 1.75 mLs into the muscle every 3 (three) months., Disp: , Rfl:     lithium carbonate 150 MG capsule, Take 1 capsule (150 mg total) by mouth once daily., Disp: 30 capsule, Rfl: 0    traZODone (DESYREL) 100 MG tablet, Take 100 mg by mouth every evening., Disp: , Rfl:     benztropine (COGENTIN) 1 MG tablet, Take 1 tablet (1 mg total) by mouth once daily., Disp: 30 tablet, Rfl: 0    doxepin (SINEQUAN) 10 MG capsule, Take 1 capsule (10 mg total) by mouth every evening., Disp: 30 capsule, Rfl: 0    drospirenone-estetrol (NEXTSTELLIS) 3 mg- 14.2 mg (28) Tab, Take 1 tablet by mouth once daily., Disp: 28 tablet, Rfl: 11    ergocalciferol, vitamin D2, (VITAMIN D ORAL), Take by mouth. (Patient not taking: Reported on 11/7/2024), Disp: , Rfl:     fluticasone propionate (FLONASE) 50 mcg/actuation nasal spray, 1 spray by Each Nostril route Daily. (Patient not taking: Reported on 11/7/2024), Disp: , Rfl:     loratadine (CLARITIN) 10 mg tablet, 1 tablet Orally Once a day for 30 days (Patient not taking: Reported on 11/7/2024), Disp: , Rfl:     metFORMIN (GLUCOPHAGE) 500 MG tablet, Take 1 tablet (500 mg total) by mouth 2 (two) times daily with meals., Disp: 60 tablet, Rfl: 11    multivitamin capsule, Take  "1 capsule by mouth once daily. (Patient not taking: Reported on 11/7/2024), Disp: , Rfl:       Physical Exam:  /80   Pulse 80   Temp 97 °F (36.1 °C) (Temporal)   Resp 16   Ht 5' 6" (1.676 m)   Wt 97.5 kg (215 lb)   LMP 11/30/2023 (Approximate) Comment: patient stated she has not had a cycle in "about a year" unable to provide an approximate month  SpO2 97%   BMI 34.70 kg/m²     Constitutional: General appearance: healthy, well-nourished and well-developed   Psychiatric:  Orientation to time, place and person. Normal mood and affect and active, alert       Assessment/Plan:  1. History of PCOS  -     metFORMIN (GLUCOPHAGE) 500 MG tablet; Take 1 tablet (500 mg total) by mouth 2 (two) times daily with meals.  Dispense: 60 tablet; Refill: 11  -     US Pelvis Limited Non OB; Future; Expected date: 11/12/2024  -     Prolactin; Future; Expected date: 11/12/2024  -     T4, Free; Future; Expected date: 11/12/2024  -     TSH; Future; Expected date: 11/12/2024  -     Follicle Stimulating Hormone; Future; Expected date: 11/12/2024  -     Estradiol; Future; Expected date: 11/12/2024  -     DHEA-Sulfate; Future; Expected date: 11/12/2024  -     TESTOSTERONE, FREE (DIALYSIS) AND TOTAL, LC/MS/MS; Future; Expected date: 11/12/2024    2. Irregular menses    3. Encounter for initial prescription of contraceptive pills  -     POCT Urine Pregnancy  -     drospirenone-estetrol (NEXTSTELLIS) 3 mg- 14.2 mg (28) Tab; Take 1 tablet by mouth once daily.  Dispense: 28 tablet; Refill: 11    Educated Polycystic ovary syndrome (PCOS) is characterized clinically by oligomenorrhea and hyperandrogenism, as well as the frequent presence of associated risk factors for cardiovascular disease, including obesity, glucose intolerance, dyslipidemia, and obstructive sleep apnea.      UPT negative     Will call pt with results.    RTC in 3 months to f/u on Nextstellis and Glucophage.        "

## 2024-11-22 ENCOUNTER — HOSPITAL ENCOUNTER (OUTPATIENT)
Dept: RADIOLOGY | Facility: HOSPITAL | Age: 29
Discharge: HOME | End: 2024-11-22
Payer: MEDICAID

## 2024-11-22 DIAGNOSIS — Z87.42 HISTORY OF PCOS: ICD-10-CM

## 2024-11-22 PROCEDURE — 76857 US EXAM PELVIC LIMITED: CPT | Mod: TC

## 2025-02-20 ENCOUNTER — OFFICE VISIT (OUTPATIENT)
Dept: OBSTETRICS AND GYNECOLOGY | Facility: CLINIC | Age: 30
End: 2025-02-20
Payer: MEDICAID

## 2025-02-20 VITALS
TEMPERATURE: 98 F | HEART RATE: 98 BPM | WEIGHT: 214.94 LBS | BODY MASS INDEX: 34.55 KG/M2 | DIASTOLIC BLOOD PRESSURE: 85 MMHG | OXYGEN SATURATION: 99 % | HEIGHT: 66 IN | SYSTOLIC BLOOD PRESSURE: 135 MMHG | RESPIRATION RATE: 18 BRPM

## 2025-02-20 DIAGNOSIS — Z30.41 ORAL CONTRACEPTIVE USE: Primary | ICD-10-CM

## 2025-02-20 DIAGNOSIS — Z87.42 HISTORY OF PCOS: ICD-10-CM

## 2025-02-20 DIAGNOSIS — N92.6 IRREGULAR MENSES: ICD-10-CM

## 2025-02-20 LAB
B-HCG UR QL: NEGATIVE
CTP QC/QA: YES

## 2025-02-20 PROCEDURE — 81025 URINE PREGNANCY TEST: CPT | Mod: PBBFAC,PN

## 2025-02-20 PROCEDURE — 99214 OFFICE O/P EST MOD 30 MIN: CPT | Mod: PBBFAC,PN

## 2025-02-20 RX ORDER — XANOMELINE AND TROSPIUM CHLORIDE 20; 50 MG/1; MG/1
CAPSULE, COATED PELLETS ORAL
COMMUNITY
Start: 2025-02-12

## 2025-02-20 RX ORDER — ERGOCALCIFEROL 1.25 MG/1
CAPSULE ORAL
COMMUNITY

## 2025-02-20 NOTE — PROGRESS NOTES
Chief Complaint:  Follow-up, Contraception, and Oligomenorrhea    History of Present Illness:  Patient is a 29 y.o.  presents to clinic for follow up on Nextstellis OCP and metformin for cycle and PCOS control.  Patient was experiencing oligomenorrhea and menorrhagia.  Patient states cycles have regulated and she desires to continue.  Patient states she is not drawn labs that were ordered.    Previous History of Present Illness (24):  Patient is a 29 y.o.  presents to clinic to discuss contraception for management of PCOS.  Patient states it has been quite a few years she has had a workup.  Patient states periods are very irregular coming only every few months and are typically heavy in flow. Pt states she was managed with OCP and metformin in the past and would like to restart this regimen.  Patient has never been sexually active.      Review of systems:  General/Constitutional: Chills denies. Fatigue/weakness denies. Fever denies. Night sweats denies. Hot flashes denies  Breast: Dimpling denies. Breast mass denies. Breast pain/tenderness denies. Nipple discharge denies. Puckering denies.  Gastrointestinal: Abdominal pain denies. Blood in stool denies. Constipation denies. Diarrhea denies. Heartburn denies. Nausea denies. Vomiting denies   Genitourinary: Incontinence denies. Blood in urine denies. Frequent urination denies. Urgency denies. Painful urination denies. Nocturia denies   Gynecologic: Irregular menses denies. Heavy bleeding denies. Painful menses denies. Vaginal discharge denies. Vaginal odor denies. Vaginal itching/Irritation denies. Vaginal lesion denies.  Pelvic pain denies. Decreased libido denies. Vulvar lesion denies. Prolapse of genital organs denies. Painful intercourse denies. Postcoital bleeding denies   Psychiatric: Mood lability denies. Depressed mood denies. Suicidal thoughts denies. Anxiety denies. Overwhelmed denies. Appetite normal. Energy level normal.     OB History  "   Para Term  AB Living   0 0 0 0 0 0   SAB IAB Ectopic Multiple Live Births   0 0 0 0 0   Obstetric Comments   GYN HISTORY CYCLIC   LMP:    Frequency: irregular   Cycle length: 6-7 days   Flow: heavy   Intermenstrual bleeding: No   Postcoital bleeding: No   Dysmenorrhea: No   Sexually active: no   Dyspareunia: No   Contraception: none   H/o STI: No    Last pap:    H/o abnl pap: No   Gardasil: 3/3      The patient has never been pregnant.    Past Medical History:   Diagnosis Date    Bipolar disorder, unspecified     Hyperlipidemia     PCOS (polycystic ovarian syndrome)     Schizo affective schizophrenia      Current Medications[1]      Physical Exam:  /85   Pulse 98   Temp 98 °F (36.7 °C) (Temporal)   Resp 18   Ht 5' 6" (1.676 m)   Wt 97.5 kg (214 lb 15.2 oz)   LMP 2025 (Approximate)   SpO2 99%   BMI 34.69 kg/m²     Constitutional: General appearance: healthy, well-nourished and well-developed   Psychiatric:  Orientation to time, place and person. Normal mood and affect and active, alert   Abdomen: Auscultation/Inspection/Palpation: No tenderness or masses. Soft, nondistended         Assessment/Plan:  1. Oral contraceptive use    2. Irregular menses  -     POCT Urine Pregnancy    3. History of PCOS  -     POCT Urine Pregnancy         Patient states OCP and Glucophage or working well.  Desires to continue.  Patient will also draw labs and I will call her if anything is abnormal needs to be addressed.  Patient to return to clinic p.r.n. or for annual.             [1]   Current Outpatient Medications:     COBENFY 50-20 mg Cap, TAKE 1 CAPSULE BY MOUTH ONE HOUR BEFORE OR 2 HOURS AFTER A MEAL TWICE A DAY, Disp: , Rfl:     drospirenone-estetrol (NEXTSTELLIS) 3 mg- 14.2 mg (28) Tab, Take 1 tablet by mouth once daily., Disp: 28 tablet, Rfl: 11    ergocalciferol (ERGOCALCIFEROL) 50,000 unit Cap, TAKE 1 CAPSULE BY MOUTH ONCE A WEEK FOR VITAMIN D, Disp: , Rfl:     fenofibrate micronized " (LOFIBRA) 134 MG Cap, 1 capsule with a meal Orally Once a day for 30 days, Disp: , Rfl:     lithium carbonate 150 MG capsule, Take 1 capsule (150 mg total) by mouth once daily., Disp: 30 capsule, Rfl: 0    metFORMIN (GLUCOPHAGE) 500 MG tablet, Take 1 tablet (500 mg total) by mouth 2 (two) times daily with meals., Disp: 60 tablet, Rfl: 11    traZODone (DESYREL) 100 MG tablet, Take 100 mg by mouth every evening., Disp: , Rfl:     ergocalciferol, vitamin D2, (VITAMIN D ORAL), Take by mouth. (Patient not taking: Reported on 2/20/2025), Disp: , Rfl:     fluticasone propionate (FLONASE) 50 mcg/actuation nasal spray, 1 spray by Each Nostril route Daily. (Patient not taking: Reported on 2/20/2025), Disp: , Rfl:     INVEGA TRINZA 546 mg/1.75 mL Syrg, Inject 1.75 mLs into the muscle every 3 (three) months. (Patient not taking: Reported on 2/20/2025), Disp: , Rfl:     loratadine (CLARITIN) 10 mg tablet, 1 tablet Orally Once a day for 30 days (Patient not taking: Reported on 2/20/2025), Disp: , Rfl:     multivitamin capsule, Take 1 capsule by mouth once daily. (Patient not taking: Reported on 2/20/2025), Disp: , Rfl:

## 2025-05-16 ENCOUNTER — HOSPITAL ENCOUNTER (OUTPATIENT)
Dept: RADIOLOGY | Facility: HOSPITAL | Age: 30
Discharge: HOME | End: 2025-05-16
Attending: NURSE PRACTITIONER
Payer: MEDICAID

## 2025-05-16 DIAGNOSIS — M25.561 RIGHT KNEE PAIN: ICD-10-CM

## 2025-05-16 PROCEDURE — 73560 X-RAY EXAM OF KNEE 1 OR 2: CPT | Mod: TC,RT

## 2025-05-19 DIAGNOSIS — M25.561 RIGHT KNEE PAIN: Primary | ICD-10-CM

## 2025-05-31 ENCOUNTER — HOSPITAL ENCOUNTER (INPATIENT)
Facility: HOSPITAL | Age: 30
LOS: 6 days | Discharge: HOME | DRG: 885 | End: 2025-06-06
Attending: PSYCHIATRY & NEUROLOGY | Admitting: PSYCHIATRY & NEUROLOGY
Payer: MEDICAID

## 2025-05-31 ENCOUNTER — HOSPITAL ENCOUNTER (EMERGENCY)
Facility: HOSPITAL | Age: 30
Discharge: PSYCHIATRIC OR SUBSTANCE USE FACILITY | End: 2025-05-31
Attending: INTERNAL MEDICINE
Payer: MEDICAID

## 2025-05-31 VITALS
HEART RATE: 104 BPM | SYSTOLIC BLOOD PRESSURE: 124 MMHG | DIASTOLIC BLOOD PRESSURE: 86 MMHG | OXYGEN SATURATION: 96 % | TEMPERATURE: 98 F | BODY MASS INDEX: 30.92 KG/M2 | WEIGHT: 192.38 LBS | RESPIRATION RATE: 20 BRPM | HEIGHT: 66 IN

## 2025-05-31 DIAGNOSIS — F32.A DEPRESSION WITH SUICIDAL IDEATION: Primary | ICD-10-CM

## 2025-05-31 DIAGNOSIS — R45.851 DEPRESSION WITH SUICIDAL IDEATION: Primary | ICD-10-CM

## 2025-05-31 DIAGNOSIS — F32.A DEPRESSION: ICD-10-CM

## 2025-05-31 DIAGNOSIS — Z79.899 DRUG THERAPY: ICD-10-CM

## 2025-05-31 DIAGNOSIS — Z00.8 MEDICAL CLEARANCE FOR PSYCHIATRIC ADMISSION: ICD-10-CM

## 2025-05-31 LAB
ACCEPTIBLE SP GR UR QL: 1.01 (ref 1–1.03)
ALBUMIN SERPL-MCNC: 4.4 G/DL (ref 3.5–5)
ALBUMIN/GLOB SERPL: 1.3 RATIO (ref 1.1–2)
ALP SERPL-CCNC: 35 UNIT/L (ref 40–150)
ALT SERPL-CCNC: 33 UNIT/L (ref 0–55)
AMPHET UR QL SCN: NEGATIVE
ANION GAP SERPL CALC-SCNC: 8 MEQ/L
APAP SERPL-MCNC: <3 UG/ML (ref 10–30)
AST SERPL-CCNC: 15 UNIT/L (ref 11–45)
B-HCG UR QL: NEGATIVE
BACTERIA #/AREA URNS AUTO: ABNORMAL /HPF
BARBITURATE SCN PRESENT UR: NEGATIVE
BASOPHILS # BLD AUTO: 0.04 X10(3)/MCL
BASOPHILS NFR BLD AUTO: 0.7 %
BENZODIAZ UR QL SCN: NEGATIVE
BILIRUB SERPL-MCNC: 0.3 MG/DL
BILIRUB UR QL STRIP.AUTO: NEGATIVE
BUN SERPL-MCNC: 16 MG/DL (ref 7–18.7)
CALCIUM SERPL-MCNC: 9.5 MG/DL (ref 8.4–10.2)
CANNABINOIDS UR QL SCN: NEGATIVE
CHLORIDE SERPL-SCNC: 111 MMOL/L (ref 98–107)
CLARITY UR: CLEAR
CO2 SERPL-SCNC: 22 MMOL/L (ref 22–29)
COCAINE UR QL SCN: NEGATIVE
COLOR UR AUTO: YELLOW
CREAT SERPL-MCNC: 0.85 MG/DL (ref 0.55–1.02)
CREAT/UREA NIT SERPL: 19
EOSINOPHIL # BLD AUTO: 0.13 X10(3)/MCL (ref 0–0.9)
EOSINOPHIL NFR BLD AUTO: 2.1 %
ERYTHROCYTE [DISTWIDTH] IN BLOOD BY AUTOMATED COUNT: 12.2 % (ref 11.5–17)
ETHANOL SERPL-MCNC: <10 MG/DL
FENTANYL UR QL SCN: NEGATIVE
FLUAV AG UPPER RESP QL IA.RAPID: NOT DETECTED
FLUBV AG UPPER RESP QL IA.RAPID: NOT DETECTED
GFR SERPLBLD CREATININE-BSD FMLA CKD-EPI: >60 ML/MIN/1.73/M2
GLOBULIN SER-MCNC: 3.3 GM/DL (ref 2.4–3.5)
GLUCOSE SERPL-MCNC: 124 MG/DL (ref 74–100)
GLUCOSE UR QL STRIP: NEGATIVE
HCT VFR BLD AUTO: 39.4 % (ref 37–47)
HGB BLD-MCNC: 13.6 G/DL (ref 12–16)
HGB UR QL STRIP: NEGATIVE
IMM GRANULOCYTES # BLD AUTO: 0.01 X10(3)/MCL (ref 0–0.04)
IMM GRANULOCYTES NFR BLD AUTO: 0.2 %
KETONES UR QL STRIP: NEGATIVE
LEUKOCYTE ESTERASE UR QL STRIP: ABNORMAL
LYMPHOCYTES # BLD AUTO: 2.04 X10(3)/MCL (ref 0.6–4.6)
LYMPHOCYTES NFR BLD AUTO: 33.6 %
MCH RBC QN AUTO: 30 PG (ref 27–31)
MCHC RBC AUTO-ENTMCNC: 34.5 G/DL (ref 33–36)
MCV RBC AUTO: 87 FL (ref 80–94)
MDMA UR QL SCN: POSITIVE
MONOCYTES # BLD AUTO: 0.45 X10(3)/MCL (ref 0.1–1.3)
MONOCYTES NFR BLD AUTO: 7.4 %
NEUTROPHILS # BLD AUTO: 3.4 X10(3)/MCL (ref 2.1–9.2)
NEUTROPHILS NFR BLD AUTO: 56 %
NITRITE UR QL STRIP: NEGATIVE
NRBC BLD AUTO-RTO: 0 %
OPIATES UR QL SCN: NEGATIVE
PCP UR QL: NEGATIVE
PH UR STRIP: 8.5 [PH]
PH UR: 8.5 [PH] (ref 3–11)
PLATELET # BLD AUTO: 243 X10(3)/MCL (ref 130–400)
PMV BLD AUTO: 9.7 FL (ref 7.4–10.4)
POTASSIUM SERPL-SCNC: 3.4 MMOL/L (ref 3.5–5.1)
PROT SERPL-MCNC: 7.7 GM/DL (ref 6.4–8.3)
PROT UR QL STRIP: NEGATIVE
RBC # BLD AUTO: 4.53 X10(6)/MCL (ref 4.2–5.4)
RBC #/AREA URNS AUTO: ABNORMAL /HPF
SALICYLATES SERPL-MCNC: <5 MG/DL (ref 15–30)
SARS-COV-2 RNA RESP QL NAA+PROBE: NOT DETECTED
SODIUM SERPL-SCNC: 141 MMOL/L (ref 136–145)
SP GR UR STRIP.AUTO: 1.01 (ref 1–1.03)
SQUAMOUS #/AREA URNS AUTO: ABNORMAL /HPF
UROBILINOGEN UR STRIP-ACNC: 0.2
WBC # BLD AUTO: 6.07 X10(3)/MCL (ref 4.5–11.5)
WBC #/AREA URNS AUTO: ABNORMAL /HPF

## 2025-05-31 PROCEDURE — 80307 DRUG TEST PRSMV CHEM ANLYZR: CPT | Performed by: INTERNAL MEDICINE

## 2025-05-31 PROCEDURE — 85025 COMPLETE CBC W/AUTO DIFF WBC: CPT | Performed by: INTERNAL MEDICINE

## 2025-05-31 PROCEDURE — 80053 COMPREHEN METABOLIC PANEL: CPT | Performed by: INTERNAL MEDICINE

## 2025-05-31 PROCEDURE — 25000003 PHARM REV CODE 250: Performed by: PSYCHIATRY & NEUROLOGY

## 2025-05-31 PROCEDURE — 0240U COVID/FLU A&B PCR: CPT | Performed by: INTERNAL MEDICINE

## 2025-05-31 PROCEDURE — 80179 DRUG ASSAY SALICYLATE: CPT | Performed by: INTERNAL MEDICINE

## 2025-05-31 PROCEDURE — 99285 EMERGENCY DEPT VISIT HI MDM: CPT

## 2025-05-31 PROCEDURE — 11400000 HC PSYCH PRIVATE ROOM

## 2025-05-31 PROCEDURE — 81001 URINALYSIS AUTO W/SCOPE: CPT | Performed by: INTERNAL MEDICINE

## 2025-05-31 PROCEDURE — 80143 DRUG ASSAY ACETAMINOPHEN: CPT | Performed by: INTERNAL MEDICINE

## 2025-05-31 PROCEDURE — 81025 URINE PREGNANCY TEST: CPT | Performed by: INTERNAL MEDICINE

## 2025-05-31 PROCEDURE — 82077 ASSAY SPEC XCP UR&BREATH IA: CPT | Performed by: INTERNAL MEDICINE

## 2025-05-31 PROCEDURE — 80178 ASSAY OF LITHIUM: CPT | Performed by: INTERNAL MEDICINE

## 2025-05-31 RX ORDER — DIPHENHYDRAMINE HCL 25 MG
50 CAPSULE ORAL EVERY 6 HOURS PRN
Status: DISCONTINUED | OUTPATIENT
Start: 2025-05-31 | End: 2025-06-06 | Stop reason: HOSPADM

## 2025-05-31 RX ORDER — TRAZODONE HYDROCHLORIDE 100 MG/1
100 TABLET ORAL NIGHTLY PRN
Status: DISCONTINUED | OUTPATIENT
Start: 2025-05-31 | End: 2025-06-06 | Stop reason: HOSPADM

## 2025-05-31 RX ORDER — HYDROXYZINE HYDROCHLORIDE 50 MG/1
50 TABLET, FILM COATED ORAL EVERY 4 HOURS PRN
Status: DISCONTINUED | OUTPATIENT
Start: 2025-05-31 | End: 2025-06-06 | Stop reason: HOSPADM

## 2025-05-31 RX ORDER — ALUMINUM HYDROXIDE, MAGNESIUM HYDROXIDE, AND SIMETHICONE 1200; 120; 1200 MG/30ML; MG/30ML; MG/30ML
30 SUSPENSION ORAL EVERY 6 HOURS PRN
Status: DISCONTINUED | OUTPATIENT
Start: 2025-05-31 | End: 2025-06-06 | Stop reason: HOSPADM

## 2025-05-31 RX ORDER — LORAZEPAM 1 MG/1
2 TABLET ORAL EVERY 6 HOURS PRN
Status: DISCONTINUED | OUTPATIENT
Start: 2025-05-31 | End: 2025-06-06 | Stop reason: HOSPADM

## 2025-05-31 RX ORDER — IBUPROFEN 200 MG
1 TABLET ORAL DAILY
Status: DISCONTINUED | OUTPATIENT
Start: 2025-06-01 | End: 2025-06-06 | Stop reason: HOSPADM

## 2025-05-31 RX ORDER — HALOPERIDOL 5 MG/1
5 TABLET ORAL EVERY 6 HOURS PRN
Status: DISCONTINUED | OUTPATIENT
Start: 2025-05-31 | End: 2025-06-06 | Stop reason: HOSPADM

## 2025-05-31 RX ORDER — LITHIUM CARBONATE 300 MG/1
300 CAPSULE ORAL NIGHTLY
Status: ON HOLD | COMMUNITY
Start: 2025-05-12 | End: 2025-06-05

## 2025-05-31 RX ORDER — DIPHENHYDRAMINE HYDROCHLORIDE 50 MG/ML
50 INJECTION, SOLUTION INTRAMUSCULAR; INTRAVENOUS EVERY 6 HOURS PRN
Status: DISCONTINUED | OUTPATIENT
Start: 2025-05-31 | End: 2025-06-06 | Stop reason: HOSPADM

## 2025-05-31 RX ORDER — HALOPERIDOL LACTATE 5 MG/ML
5 INJECTION, SOLUTION INTRAMUSCULAR EVERY 6 HOURS PRN
Status: DISCONTINUED | OUTPATIENT
Start: 2025-05-31 | End: 2025-06-06 | Stop reason: HOSPADM

## 2025-05-31 RX ORDER — LORAZEPAM 2 MG/ML
2 INJECTION INTRAMUSCULAR EVERY 6 HOURS PRN
Status: DISCONTINUED | OUTPATIENT
Start: 2025-05-31 | End: 2025-06-06 | Stop reason: HOSPADM

## 2025-05-31 RX ORDER — ACETAMINOPHEN 325 MG/1
650 TABLET ORAL EVERY 6 HOURS PRN
Status: DISCONTINUED | OUTPATIENT
Start: 2025-05-31 | End: 2025-06-06 | Stop reason: HOSPADM

## 2025-05-31 RX ADMIN — TRAZODONE HYDROCHLORIDE 100 MG: 50 TABLET ORAL at 11:05

## 2025-06-01 LAB — LITHIUM SERPL-MCNC: 0.29 MMOL/L (ref 0.5–1.2)

## 2025-06-01 PROCEDURE — 25000003 PHARM REV CODE 250: Performed by: PSYCHIATRY & NEUROLOGY

## 2025-06-01 PROCEDURE — 25000003 PHARM REV CODE 250: Performed by: FAMILY MEDICINE

## 2025-06-01 PROCEDURE — 11400000 HC PSYCH PRIVATE ROOM

## 2025-06-01 PROCEDURE — 25000003 PHARM REV CODE 250: Performed by: NURSE PRACTITIONER

## 2025-06-01 RX ORDER — LITHIUM CARBONATE 300 MG/1
300 TABLET, FILM COATED, EXTENDED RELEASE ORAL 2 TIMES DAILY
Status: DISCONTINUED | OUTPATIENT
Start: 2025-06-01 | End: 2025-06-06 | Stop reason: HOSPADM

## 2025-06-01 RX ORDER — ZIPRASIDONE HYDROCHLORIDE 20 MG/1
20 CAPSULE ORAL 2 TIMES DAILY
Status: DISCONTINUED | OUTPATIENT
Start: 2025-06-01 | End: 2025-06-05

## 2025-06-01 RX ORDER — CETIRIZINE HYDROCHLORIDE 10 MG/1
10 TABLET ORAL DAILY
Status: DISCONTINUED | OUTPATIENT
Start: 2025-06-01 | End: 2025-06-06 | Stop reason: HOSPADM

## 2025-06-01 RX ADMIN — TRAZODONE HYDROCHLORIDE 100 MG: 50 TABLET ORAL at 08:06

## 2025-06-01 RX ADMIN — ZIPRASIDONE HYDROCHLORIDE 20 MG: 20 CAPSULE ORAL at 08:06

## 2025-06-01 RX ADMIN — LITHIUM CARBONATE 300 MG: 300 TABLET, EXTENDED RELEASE ORAL at 08:06

## 2025-06-01 RX ADMIN — CETIRIZINE HYDROCHLORIDE 10 MG: 10 TABLET, FILM COATED ORAL at 03:06

## 2025-06-01 NOTE — PLAN OF CARE
Problem: Adult Behavioral Health Plan of Care  Goal: Plan of Care Review  Outcome: Not Progressing  Flowsheets (Taken 6/1/2025 0413)  Patient Agreement with Plan of Care: agrees  Plan of Care Reviewed With: patient  Goal: Patient-Specific Goal (Individualization)  Outcome: Not Progressing  Goal: Adheres to Safety Considerations for Self and Others  Outcome: Not Progressing  Goal: Absence of New-Onset Illness or Injury  Outcome: Not Progressing  Goal: Optimized Coping Skills in Response to Life Stressors  Outcome: Not Progressing  Goal: Develops/Participates in Therapeutic [place] to Support Successful Transition  Outcome: Not Progressing  Goal: Rounds/Family Conference  Outcome: Not Progressing     Problem: Suicide Risk  Goal: Absence of Self-Harm  Outcome: Not Progressing  Intervention: Assess Risk to Self and Maintain Safety  Flowsheets (Taken 6/1/2025 0413)  Behavior Management: behavioral plan reviewed  Enhanced Safety Measures: monitored by video  Self-Harm Prevention: environmental self-harm risks assessed     Problem: Violence Risk or Actual  Goal: Anger and Impulse Control  Outcome: Not Progressing  Intervention: Minimize Safety Risk  Flowsheets (Taken 6/1/2025 0413)  Behavior Management: behavioral plan reviewed  De-Escalation Techniques: appropriate behavior reinforced  Enhanced Safety Measures: monitored by video  Intervention: Promote Self-Control  Flowsheets (Taken 6/1/2025 0413)  Supportive Measures:   active listening utilized   self-care encouraged   self-reflection promoted   self-responsibility promoted  Environmental Support: rest periods encouraged     Problem: Psychotic Signs/Symptoms  Goal: Improved Behavioral Control (Psychotic Signs/Symptoms)  Outcome: Not Progressing  Flowsheets (Taken 6/1/2025 0413)  Mutually Determined Action Steps (Improved Behavioral Control): identifies symptoms triggers  Intervention: Manage Behavior  Flowsheets (Taken 6/1/2025 0413)  De-Escalation Techniques:  appropriate behavior reinforced  Goal: Optimal Cognitive Function (Psychotic Signs/Symptoms)  Outcome: Not Progressing  Flowsheets (Taken 6/1/2025 0413)  Mutually Determined Action Steps (Optimal Cognitive Function):   remains focused during activity   follows step-by-step instructions  Intervention: Support and Promote Cognitive Ability  Flowsheets (Taken 6/1/2025 0413)  Trust Relationship/Rapport: care explained  Goal: Increased Participation and Engagement (Psychotic Signs/Symptoms)  Outcome: Not Progressing  Goal: Improved Mood Symptoms (Psychotic Signs/Symptoms)  Outcome: Not Progressing  Goal: Improved Psychomotor Symptoms (Psychotic Signs/Symptoms)  Outcome: Not Progressing  Goal: Decreased Sensory Symptoms (Psychotic Signs/Symptoms)  Outcome: Not Progressing  Flowsheets (Taken 6/1/2025 0413)  Mutually Determined Action Steps (Decreased Sensory Symptoms): adheres to medication regimen  Goal: Improved Sleep (Psychotic Signs/Symptoms)  Outcome: Not Progressing  Goal: Enhanced Social, Occupational or Functional Skills (Psychotic Signs/Symptoms)  Outcome: Not Progressing

## 2025-06-01 NOTE — H&P
[facility]  History & Physical    Subjective:      Chief Complaint/Reason for Admission:     Brit Baird is a 30 y.o. female admitted by [facility] from ER.   From ER note: psycho effective disorder and has been having SI for 1 year but thoughts have been more frequent. States she would lay on the train track and let the train run over her.       She states she sometimes takes metformin for PCOS but does not really want to take it right now.   Also c/o AR , requests claritin    Problem List[1]   Past Medical History:   Diagnosis Date    Bipolar disorder, unspecified     Hyperlipidemia     PCOS (polycystic ovarian syndrome)     Schizo affective schizophrenia      No past surgical history on file.  Family History   Problem Relation Name Age of Onset    Mental illness Mother      Heart disease Father      Bipolar disorder Father       Social History[2]    PTA Medications   Medication Sig    COBENFY 50-20 mg Cap TAKE 1 CAPSULE BY MOUTH ONE HOUR BEFORE OR 2 HOURS AFTER A MEAL TWICE A DAY    drospirenone-estetrol (NEXTSTELLIS) 3 mg- 14.2 mg (28) Tab Take 1 tablet by mouth once daily.    ergocalciferol (ERGOCALCIFEROL) 50,000 unit Cap TAKE 1 CAPSULE BY MOUTH ONCE A WEEK FOR VITAMIN D    ergocalciferol, vitamin D2, (VITAMIN D ORAL) Take by mouth. (Patient not taking: Reported on 2/20/2025)    fenofibrate micronized (LOFIBRA) 134 MG Cap 1 capsule with a meal Orally Once a day for 30 days    fluticasone propionate (FLONASE) 50 mcg/actuation nasal spray 1 spray by Each Nostril route Daily. (Patient not taking: Reported on 2/20/2025)    INVEGA TRINZA 546 mg/1.75 mL Syrg Inject 1.75 mLs into the muscle every 3 (three) months. (Patient not taking: Reported on 2/20/2025)    lithium (ESKALITH) 300 MG capsule Take 300 mg by mouth every evening.    loratadine (CLARITIN) 10 mg tablet     metFORMIN (GLUCOPHAGE) 500 MG tablet Take 1 tablet (500 mg total) by mouth 2 (two) times daily with meals.     multivitamin capsule Take 1 capsule by mouth once daily. (Patient not taking: Reported on 2/20/2025)    traZODone (DESYREL) 100 MG tablet Take 100 mg by mouth every evening.     Review of patient's allergies indicates:   Allergen Reactions    Gluten protein     Aripiprazole      Other Reaction(s): respiratory distress    Ibuprofen     Lurasidone      Other Reaction(s): Not available        Review of Systems   Constitutional: Negative.    HENT:  Positive for sinus pain.         Allergic rhinits   Eyes: Negative.    Respiratory: Negative.     Cardiovascular: Negative.    Gastrointestinal: Negative.    Genitourinary: Negative.    Musculoskeletal: Negative.    Skin: Negative.    Neurological: Negative.    Psychiatric/Behavioral:  Positive for suicidal ideas.        Objective:      Vital Signs (Most Recent)  Body mass index is 30.67 kg/m².   Temp: 98.6 °F (37 °C) (06/01/25 0701)  Pulse: 87 (06/01/25 0701)  Resp: 18 (06/01/25 0701)  BP: 116/73 (06/01/25 0701)  SpO2: 97 % (06/01/25 0701)    Vital Signs Range (Last 24H):  Temp:  [98 °F (36.7 °C)-98.6 °F (37 °C)]   Pulse:  []   Resp:  [18-20]   BP: (116-136)/(73-88)   SpO2:  [96 %-98 %]     Physical Exam  Vitals reviewed.   Constitutional:       Appearance: Normal appearance.   HENT:      Head: Normocephalic.      Right Ear: External ear normal.      Left Ear: External ear normal.      Nose: No rhinorrhea.      Mouth/Throat:      Mouth: Mucous membranes are moist.      Pharynx: No posterior oropharyngeal erythema.   Eyes:      General: No visual field deficit or scleral icterus.     Conjunctiva/sclera: Conjunctivae normal.      Pupils: Pupils are equal, round, and reactive to light.   Cardiovascular:      Rate and Rhythm: Normal rate and regular rhythm.      Heart sounds: No murmur heard.  Pulmonary:      Effort: Pulmonary effort is normal.      Breath sounds: Normal breath sounds.   Abdominal:      General: There is no distension.      Palpations: Abdomen is soft.       Tenderness: There is no abdominal tenderness.   Musculoskeletal:         General: No deformity.      Right lower leg: No edema.      Left lower leg: No edema.   Lymphadenopathy:      Cervical: No cervical adenopathy.   Skin:     Findings: No rash.   Neurological:      Mental Status: She is alert.      Cranial Nerves: No dysarthria or facial asymmetry.      Sensory: No sensory deficit.      Motor: No tremor.      Gait: Gait is intact.      Deep Tendon Reflexes:      Reflex Scores:       Patellar reflexes are 2+ on the right side and 2+ on the left side.     Comments: II- WNL-normal pupillary reflex bilaterally  III- WNL-extraocular muscles intact, no ptosis  SQ-UNL-kaqftytsasi movements intact  V-WNL-facial sensation intact bilaterally  VI-WNL-extraocular muscles intact no ptosis  VII- WNL-no facial asymmetry  VIII-WNL-hearing grossly intact bilaterally  IX- WNL-symmetrical elevation of palate, no uvula deviation  X-WNL--symmetrical elevation of palate, no uvula deviation  XI-WNL-normal sternocleidomastoid strength bilaterally  XII- WNL-no tongue deviation, no tongue atrophy           Current Medications[3]     Data Review:    Recent Results (from the past 48 hours)   Urinalysis, Reflex to Urine Culture    Collection Time: 05/31/25  6:41 PM    Specimen: Urine   Result Value Ref Range    Color, UA Yellow Yellow, Light-Yellow, Dark Yellow, Ivonne, Straw    Appearance, UA Clear Clear    Specific Gravity, UA 1.015 1.005 - 1.030    pH, UA 8.5 5.0 - 8.5    Protein, UA Negative Negative    Glucose, UA Negative Negative, Normal    Ketones, UA Negative Negative    Blood, UA Negative Negative    Bilirubin, UA Negative Negative    Urobilinogen, UA 0.2 0.2, 1.0, Normal    Nitrites, UA Negative Negative    Leukocyte Esterase, UA Trace (A) Negative   Urinalysis, Microscopic    Collection Time: 05/31/25  6:41 PM   Result Value Ref Range    Bacteria, UA Many (A) None Seen, Rare, Occasional /HPF    RBC, UA None Seen None Seen, 0-2,  3-5, 0-5 /HPF    WBC, UA 0-2 None Seen, 0-2, 3-5, 0-5 /HPF    Squamous Epithelial Cells, UA Rare None Seen, Rare, Occasional, Occ /HPF   Drug Screen, Urine    Collection Time: 05/31/25  6:42 PM   Result Value Ref Range    Amphetamines, Urine Negative Negative    Barbiturates, Urine Negative Negative    Benzodiazepine, Urine Negative Negative    Cannabinoids, Urine Negative Negative    Cocaine, Urine Negative Negative    Fentanyl, Urine Negative Negative    MDMA, Urine Positive (A) Negative    Opiates, Urine Negative Negative    Phencyclidine, Urine Negative Negative    pH, Urine 8.5 3.0 - 11.0    Specific Gravity, Urine Auto 1.015 1.001 - 1.035   HCG Qualitative Urine    Collection Time: 05/31/25  6:42 PM   Result Value Ref Range    hCG Qualitative, Urine Negative Negative   Comprehensive metabolic panel    Collection Time: 05/31/25  6:51 PM   Result Value Ref Range    Sodium 141 136 - 145 mmol/L    Potassium 3.4 (L) 3.5 - 5.1 mmol/L    Chloride 111 (H) 98 - 107 mmol/L    CO2 22 22 - 29 mmol/L    Glucose 124 (H) 74 - 100 mg/dL    Blood Urea Nitrogen 16.0 7.0 - 18.7 mg/dL    Creatinine 0.85 0.55 - 1.02 mg/dL    Calcium 9.5 8.4 - 10.2 mg/dL    Protein Total 7.7 6.4 - 8.3 gm/dL    Albumin 4.4 3.5 - 5.0 g/dL    Globulin 3.3 2.4 - 3.5 gm/dL    Albumin/Globulin Ratio 1.3 1.1 - 2.0 ratio    Bilirubin Total 0.3 <=1.5 mg/dL    ALP 35 (L) 40 - 150 unit/L    ALT 33 0 - 55 unit/L    AST 15 11 - 45 unit/L    eGFR >60 mL/min/1.73/m2    Anion Gap 8.0 mEq/L    BUN/Creatinine Ratio 19    Ethanol    Collection Time: 05/31/25  6:51 PM   Result Value Ref Range    Ethanol Level <10.0 <=10.0 mg/dL   Acetaminophen level    Collection Time: 05/31/25  6:51 PM   Result Value Ref Range    Acetaminophen Level <3.0 (L) 10.0 - 30.0 ug/ml   Salicylate level    Collection Time: 05/31/25  6:51 PM   Result Value Ref Range    Salicylate Level <5.0 (L) 15.0 - 30.0 mg/dL   CBC with Differential    Collection Time: 05/31/25  6:51 PM   Result Value Ref  Range    WBC 6.07 4.50 - 11.50 x10(3)/mcL    RBC 4.53 4.20 - 5.40 x10(6)/mcL    Hgb 13.6 12.0 - 16.0 g/dL    Hct 39.4 37.0 - 47.0 %    MCV 87.0 80.0 - 94.0 fL    MCH 30.0 27.0 - 31.0 pg    MCHC 34.5 33.0 - 36.0 g/dL    RDW 12.2 11.5 - 17.0 %    Platelet 243 130 - 400 x10(3)/mcL    MPV 9.7 7.4 - 10.4 fL    Neut % 56.0 %    Lymph % 33.6 %    Mono % 7.4 %    Eos % 2.1 %    Basophil % 0.7 %    Imm Grans % 0.2 %    Neut # 3.40 2.1 - 9.2 x10(3)/mcL    Lymph # 2.04 0.6 - 4.6 x10(3)/mcL    Mono # 0.45 0.1 - 1.3 x10(3)/mcL    Eos # 0.13 0 - 0.9 x10(3)/mcL    Baso # 0.04 <=0.2 x10(3)/mcL    Imm Gran # 0.01 0.00 - 0.04 x10(3)/mcL    NRBC% 0.0 %   COVID/FLU A&B PCR    Collection Time: 05/31/25  9:05 PM   Result Value Ref Range    Influenza A PCR Not Detected Not Detected    Influenza B PCR Not Detected Not Detected    SARS-CoV-2 PCR Not Detected Not Detected, Negative        No results found.       Assessment and Plan       Mood disorder - per psychiatry  PCOS - ok to hold metformin for now per patient request  AR - claritin or formulary alternative         [1]   Patient Active Problem List  Diagnosis    History of PCOS    Bipolar 1 disorder    Obesity (BMI 35.0-39.9 without comorbidity)    Bipolar affective disorder, manic, severe, with psychotic behavior    Schizophrenia    PCOS (polycystic ovarian syndrome)    Mixed hyperlipidemia   [2]   Social History  Tobacco Use    Smoking status: Never     Passive exposure: Never    Smokeless tobacco: Never   Substance Use Topics    Alcohol use: Never    Drug use: Never   [3]   Current Facility-Administered Medications:     acetaminophen tablet 650 mg, 650 mg, Oral, Q6H PRN, Jeffrey Burch MD    aluminum-magnesium hydroxide-simethicone 200-200-20 mg/5 mL suspension 30 mL, 30 mL, Oral, Q6H PRN, Jeffrey Burch MD    haloperidoL tablet 5 mg, 5 mg, Oral, Q6H PRN **AND** diphenhydrAMINE capsule 50 mg, 50 mg, Oral, Q6H PRN **AND** LORazepam tablet 2 mg, 2 mg, Oral, Q6H PRN  **AND** haloperidol lactate injection 5 mg, 5 mg, Intramuscular, Q6H PRN **AND** diphenhydrAMINE injection 50 mg, 50 mg, Intramuscular, Q6H PRN **AND** LORazepam injection 2 mg, 2 mg, Intramuscular, Q6H PRN, Jeffrey Burch MD    hydrOXYzine tablet 50 mg, 50 mg, Oral, Q4H PRN, Jeffrey Burch MD    nicotine 21 mg/24 hr 1 patch, 1 patch, Transdermal, Daily, Jeffrey Burch MD    traZODone tablet 100 mg, 100 mg, Oral, Nightly PRN, Jeffrey Burch MD, 100 mg at 05/31/25 5476

## 2025-06-01 NOTE — PROGRESS NOTES
06/01/25 0950   [facility] Therapy   Group Name Mental Awareness   Specific Interventions Relapse Prevention   Participation Level Appropriate   Participation Quality Cooperative   Insight/Motivation Limited   Affect/Mood Display Anxious   Cognition Alert   Psychomotor WNL

## 2025-06-01 NOTE — NURSING
2357 pt c/o restlessness. Administered PRN trazodone 100 mg at this time.     0030 pt resting quietly in bed with eyes closed. Respirations even/unlabored. No signs of distress noted at this time.

## 2025-06-01 NOTE — NURSING
New orders for EKG for 6/2/25.  Notified [facility] of new orders.  Spoke with Jaime.
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
## 2025-06-01 NOTE — PLAN OF CARE
Problem: Adult Behavioral Health Plan of Care  Goal: Plan of Care Review  Outcome: Ongoing  Goal: Patient-Specific Goal (Individualization)  Outcome: Ongoing  Goal: Adheres to Safety Considerations for Self and Others  Outcome: Ongoing  Goal: Absence of New-Onset Illness or Injury  Outcome: Ongoing  Goal: Optimized Coping Skills in Response to Life Stressors  Outcome: Ongoing  Goal: Develops/Participates in Therapeutic [place] to Support Successful Transition  Outcome: Ongoing  Goal: Rounds/Family Conference  Outcome: Ongoing     Problem: Suicide Risk  Goal: Absence of Self-Harm  Outcome: Ongoing     Problem: Violence Risk or Actual  Goal: Anger and Impulse Control  Outcome: Ongoing     Problem: Psychotic Signs/Symptoms  Goal: Improved Behavioral Control (Psychotic Signs/Symptoms)  Outcome: Ongoing  Goal: Optimal Cognitive Function (Psychotic Signs/Symptoms)  Outcome: Ongoing  Goal: Increased Participation and Engagement (Psychotic Signs/Symptoms)  Outcome: Ongoing  Goal: Improved Mood Symptoms (Psychotic Signs/Symptoms)  Outcome: Ongoing  Goal: Improved Psychomotor Symptoms (Psychotic Signs/Symptoms)  Outcome: Ongoing  Goal: Decreased Sensory Symptoms (Psychotic Signs/Symptoms)  Outcome: Ongoing  Goal: Improved Sleep (Psychotic Signs/Symptoms)  Outcome: Ongoing  Goal: Enhanced Social, Occupational or Functional Skills (Psychotic Signs/Symptoms)  Outcome: Ongoing    Pt claims she did not sleep well, and her appetite is not great, she denies SI/HI/AVH, hesitant eye contact
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
## 2025-06-02 PROBLEM — F16.10 MDMA ABUSE: Status: ACTIVE | Noted: 2025-06-02

## 2025-06-02 PROCEDURE — 11400000 HC PSYCH PRIVATE ROOM

## 2025-06-02 PROCEDURE — 25000003 PHARM REV CODE 250: Performed by: NURSE PRACTITIONER

## 2025-06-02 PROCEDURE — 25000003 PHARM REV CODE 250: Performed by: FAMILY MEDICINE

## 2025-06-02 PROCEDURE — 25000003 PHARM REV CODE 250: Performed by: PSYCHIATRY & NEUROLOGY

## 2025-06-02 RX ADMIN — TRAZODONE HYDROCHLORIDE 100 MG: 50 TABLET ORAL at 08:06

## 2025-06-02 RX ADMIN — ZIPRASIDONE HYDROCHLORIDE 20 MG: 20 CAPSULE ORAL at 08:06

## 2025-06-02 RX ADMIN — LITHIUM CARBONATE 300 MG: 300 TABLET, EXTENDED RELEASE ORAL at 08:06

## 2025-06-02 RX ADMIN — CETIRIZINE HYDROCHLORIDE 10 MG: 10 TABLET, FILM COATED ORAL at 08:06

## 2025-06-02 NOTE — PLAN OF CARE
Problem: Adult Behavioral Health Plan of Care  Goal: Plan of Care Review  Outcome: Not Progressing  Flowsheets (Taken 6/1/2025 2237)  Patient Agreement with Plan of Care: agrees  Plan of Care Reviewed With: patient  Goal: Patient-Specific Goal (Individualization)  Outcome: Not Progressing  Flowsheets (Taken 6/1/2025 2237)  Patient Personal Strengths: motivated for recovery  Patient Vulnerabilities:   lacks insight into illness   limited support system   poor impulse control  Goal: Adheres to Safety Considerations for Self and Others  Outcome: Not Progressing  Flowsheets (Taken 6/1/2025 2237)  Adheres to Safety Considerations for Self and Others: unable to achieve outcome  Intervention: Develop and Maintain Individualized Safety Plan  Flowsheets (Taken 6/1/2025 2237)  Safety Measures:   safety rounds completed   safety plan reviewed   monitored by video  Goal: Absence of New-Onset Illness or Injury  Outcome: Not Progressing  Intervention: Identify and Manage Fall Risk  Flowsheets (Taken 6/1/2025 2237)  Safety Measures:   safety rounds completed   safety plan reviewed   monitored by video  Intervention: Prevent Skin Injury  Flowsheets (Taken 6/1/2025 2237)  Device Skin Pressure Protection: adhesive use limited  Intervention: Prevent VTE (Venous Thromboembolism)  Flowsheets (Taken 6/1/2025 2237)  VTE Prevention/Management: fluids promoted  Intervention: Prevent Infection  Flowsheets (Taken 6/1/2025 2237)  Infection Prevention: rest/sleep promoted  Goal: Optimized Coping Skills in Response to Life Stressors  Outcome: Not Progressing  Flowsheets (Taken 6/1/2025 2237)  Optimized Coping Skills in Response to Life Stressors: unable to achieve outcome  Intervention: Promote Effective Coping Strategies  Flowsheets (Taken 6/1/2025 2237)  Supportive Measures:   active listening utilized   self-care encouraged   self-reflection promoted   self-responsibility promoted  Goal: Develops/Participates in Therapeutic [place] to  Support Successful Transition  Outcome: Not Progressing  Flowsheets (Taken 6/1/2025 2237)  Develops/Participates in Therapeutic [place] to Support Successful Transition: unable to achieve outcome  Intervention: Foster Therapeutic [place]  Flowsheets (Taken 6/1/2025 2237)  Trust Relationship/Rapport: care explained  Goal: Rounds/Family Conference  Outcome: Not Progressing  Flowsheets (Taken 6/1/2025 2237)  Participants:   milieu/psych techs   nursing     Problem: Suicide Risk  Goal: Absence of Self-Harm  Outcome: Not Progressing  Intervention: Assess Risk to Self and Maintain Safety  Flowsheets (Taken 6/1/2025 2237)  Behavior Management:   behavioral plan reviewed   boundaries reinforced   impulse control promoted  Enhanced Safety Measures:    at bedside   room near unit station   monitored by video  Self-Harm Prevention: environmental self-harm risks assessed  Intervention: Promote Psychosocial Wellbeing  Flowsheets (Taken 6/1/2025 2237)  Supportive Measures:   active listening utilized   self-care encouraged   self-reflection promoted   self-responsibility promoted  Intervention: Establish Safety Plan and Continuity of Care  Flowsheets (Taken 6/1/2025 2237)  Safe Transition Promotion: access to lethal means addressed     Problem: Violence Risk or Actual  Goal: Anger and Impulse Control  Outcome: Not Progressing  Intervention: Minimize Safety Risk  Flowsheets (Taken 6/1/2025 2237)  Behavior Management:   behavioral plan reviewed   boundaries reinforced   impulse control promoted  Sensory Stimulation Regulation:   auditory stimulation minimized   quiet environment promoted  De-Escalation Techniques: appropriate behavior reinforced  Enhanced Safety Measures:    at bedside   room near unit station   monitored by video  Intervention: Promote Self-Control  Flowsheets (Taken 6/1/2025 2237)  Supportive Measures:   active listening utilized   self-care encouraged   self-reflection  promoted   self-responsibility promoted  Environmental Support: calm environment promoted     Problem: Psychotic Signs/Symptoms  Goal: Improved Behavioral Control (Psychotic Signs/Symptoms)  Outcome: Not Progressing  Flowsheets (Taken 6/1/2025 2237)  Mutually Determined Action Steps (Improved Behavioral Control): identifies symptoms triggers  Intervention: Manage Behavior  Flowsheets (Taken 6/1/2025 2237)  De-Escalation Techniques: appropriate behavior reinforced  Goal: Optimal Cognitive Function (Psychotic Signs/Symptoms)  Outcome: Not Progressing  Flowsheets (Taken 6/1/2025 2237)  Mutually Determined Action Steps (Optimal Cognitive Function):   participates in problem resolution   remains focused during activity  Intervention: Support and Promote Cognitive Ability  Flowsheets (Taken 6/1/2025 2237)  Trust Relationship/Rapport: care explained  Goal: Increased Participation and Engagement (Psychotic Signs/Symptoms)  Outcome: Not Progressing  Flowsheets (Taken 6/1/2025 2237)  Mutually Determined Action Steps (Increased Participation and Engagement): identifies symptoms triggers  Intervention: Facilitate Participation and Engagement  Flowsheets (Taken 6/1/2025 2237)  Supportive Measures:   active listening utilized   self-care encouraged   self-reflection promoted   self-responsibility promoted  Diversional Activity:   television   art work  Goal: Improved Mood Symptoms (Psychotic Signs/Symptoms)  Outcome: Not Progressing  Flowsheets (Taken 6/1/2025 2237)  Mutually Determined Action Steps (Improved Mood Symptoms): acknowledges progress  Intervention: Optimize Emotion and Mood  Flowsheets (Taken 6/1/2025 2237)  Supportive Measures:   active listening utilized   self-care encouraged   self-reflection promoted   self-responsibility promoted  Diversional Activity:   television   art work  Goal: Improved Psychomotor Symptoms (Psychotic Signs/Symptoms)  Outcome: Not Progressing  Flowsheets (Taken 6/1/2025 2237)  Mutually  Determined Action Steps (Improved Psychomotor Symptoms): adheres to medication regimen  Intervention: Manage Psychomotor Movement  Flowsheets (Taken 6/1/2025 2237)  Activity (Behavioral Health): up ad radha  Diversional Activity:   television   art work  Goal: Decreased Sensory Symptoms (Psychotic Signs/Symptoms)  Outcome: Not Progressing  Flowsheets (Taken 6/1/2025 2237)  Mutually Determined Action Steps (Decreased Sensory Symptoms): adheres to medication regimen  Intervention: Minimize and Manage Sensory Impairment  Flowsheets (Taken 6/1/2025 2237)  Sensory Stimulation Regulation:   auditory stimulation minimized   quiet environment promoted  Goal: Improved Sleep (Psychotic Signs/Symptoms)  Outcome: Not Progressing  Flowsheets (Taken 6/1/2025 2237)  Mutually Determined Action Steps (Improved Sleep): sleeps 4-6 hours at night  Intervention: Promote Healthy Sleep Hygiene  Flowsheets (Taken 6/1/2025 2237)  Sleep Hygiene Promotion:   awakenings minimized   regular sleep pattern promoted  Goal: Enhanced Social, Occupational or Functional Skills (Psychotic Signs/Symptoms)  Outcome: Not Progressing  Flowsheets (Taken 6/1/2025 2237)  Mutually Determined Action Steps (Enhanced Social, Occupational or Functional Skills): participates in social skills training  Intervention: Promote Social, Occupational and Functional Ability  Flowsheets (Taken 6/1/2025 2237)  Trust Relationship/Rapport: care explained  Social Functional Ability Promotion: autonomy promoted

## 2025-06-02 NOTE — PLAN OF CARE
Problem: Adult Behavioral Health Plan of Care  Goal: Plan of Care Review  Outcome: Progressing  Goal: Patient-Specific Goal (Individualization)  Outcome: Progressing  Goal: Adheres to Safety Considerations for Self and Others  Outcome: Progressing  Goal: Absence of New-Onset Illness or Injury  Outcome: Progressing  Goal: Optimized Coping Skills in Response to Life Stressors  Outcome: Progressing  Goal: Develops/Participates in Therapeutic [place] to Support Successful Transition  Outcome: Progressing  Goal: Rounds/Family Conference  Outcome: Progressing     Problem: Suicide Risk  Goal: Absence of Self-Harm  Outcome: Progressing     Problem: Violence Risk or Actual  Goal: Anger and Impulse Control  Outcome: Progressing     Problem: Psychotic Signs/Symptoms  Goal: Improved Behavioral Control (Psychotic Signs/Symptoms)  Outcome: Progressing  Goal: Optimal Cognitive Function (Psychotic Signs/Symptoms)  Outcome: Progressing  Goal: Increased Participation and Engagement (Psychotic Signs/Symptoms)  Outcome: Progressing  Goal: Improved Mood Symptoms (Psychotic Signs/Symptoms)  Outcome: Progressing  Goal: Improved Psychomotor Symptoms (Psychotic Signs/Symptoms)  Outcome: Progressing  Goal: Decreased Sensory Symptoms (Psychotic Signs/Symptoms)  Outcome: Progressing  Goal: Improved Sleep (Psychotic Signs/Symptoms)  Outcome: Progressing  Goal: Enhanced Social, Occupational or Functional Skills (Psychotic Signs/Symptoms)  Outcome: Progressing

## 2025-06-02 NOTE — NURSING
2015 pt c/o restlessness. Administered PRN trazodone 100 mg at this time.     2115 pt resting quietly in bed with eyes closed. Respirations even/unlabored. No signs of distress noted at this time.

## 2025-06-02 NOTE — PROGRESS NOTES
"6/2/2025 12:50 PM   Brit Baird   1995   [id number]        Psychiatry Progress Note     Chief Complaint: [place]    SUBJECTIVE:   Brit Baird is a 30 y.o. female placed under a Physician's Emergency Certificate due to suicidal ideation for the past year.    Patient was previously here in 2023.  This morning, she speaks about her parents Temple beliefs and her recent changes in her "religiosity."  Had been following up at [facility].  Will continue to do so on discharge.  Cooperative with evaluation.  Will continue current treatment plan and will monitor for the need to augment.       UDS: (+)MDMA  Blood alcohol: <10  Lithium: <0.29       Current Medications:   Scheduled Meds:    cetirizine  10 mg Oral Daily    lithium  300 mg Oral BID    nicotine  1 patch Transdermal Daily    ziprasidone  20 mg Oral BID      PRN Meds:   Current Facility-Administered Medications:     acetaminophen, 650 mg, Oral, Q6H PRN    aluminum-magnesium hydroxide-simethicone, 30 mL, Oral, Q6H PRN    haloperidoL, 5 mg, Oral, Q6H PRN **AND** diphenhydrAMINE, 50 mg, Oral, Q6H PRN **AND** LORazepam, 2 mg, Oral, Q6H PRN **AND** haloperidol lactate, 5 mg, Intramuscular, Q6H PRN **AND** diphenhydrAMINE, 50 mg, Intramuscular, Q6H PRN **AND** lorazepam, 2 mg, Intramuscular, Q6H PRN    hydrOXYzine HCL, 50 mg, Oral, Q4H PRN    traZODone, 100 mg, Oral, Nightly PRN   Psychotherapeutics (From admission, onward)      Start     Stop Route Frequency Ordered    06/01/25 2100  ziprasidone capsule 20 mg         -- Oral 2 times daily 06/01/25 1610    06/01/25 2100  lithium CR tablet 300 mg         -- Oral 2 times daily 06/01/25 1704    05/31/25 2323  haloperidoL tablet 5 mg  (Med - Acute  Behavioral Management)        Placed in "And" Linked Group    -- Oral Every 6 hours PRN 05/31/25 2323    05/31/25 2323  LORazepam tablet 2 mg  (Med - Acute  Behavioral Management)        Placed in "And" Linked Group    -- Oral Every 6 hours PRN 05/31/25 2323    05/31/25 2323  " "haloperidol lactate injection 5 mg  (Med - Acute  Behavioral Management)        Placed in "And" Linked Group    -- IM Every 6 hours PRN 05/31/25 2323    05/31/25 2323  LORazepam injection 2 mg  (Med - Acute  Behavioral Management)        Placed in "And" Linked Group    -- IM Every 6 hours PRN 05/31/25 2323    05/31/25 2322  traZODone tablet 100 mg         -- Oral Nightly PRN 05/31/25 2322            Allergies:   Review of patient's allergies indicates:   Allergen Reactions    Gluten protein     Aripiprazole      Other Reaction(s): respiratory distress    Ibuprofen     Lurasidone      Other Reaction(s): Not available        OBJECTIVE:   Vitals   Vitals:    06/01/25 1100   BP: 122/80   Pulse: 87   Resp: 18   Temp: 98.6 °F (37 °C)        Labs/Imaging/Studies:   No results found for this or any previous visit (from the past 36 hours).       Medical Review Of Systems:  Constitutional: negative  Respiratory: negative  Cardiovascular: negative  Gastrointestinal: negative  Genitourinary:negative  Musculoskeletal:negative  Neurological: negative      Psychiatric Mental Status Exam:  General Appearance: appears stated age, well-developed, well-nourished  Arousal: alert  Behavior: cooperative  Movements and Motor Activity: no abnormal involuntary movements noted  Orientation: oriented to person, place, time, and situation  Speech: normal rate, normal rhythm, normal volume, normal tone  Mood: "Ok"  Affect: constricted  Thought Process: linear  Associations: intact  Thought Content and Perceptions: (+)no suicidal ideation, no homicidal ideation, no auditory hallucinations, no visual hallucinations, no paranoid ideation, no ideas of reference  Recent and Remote Memory: recent memory intact, remote memory intact; per interview/observation with patient  Attention and Concentration: intact, attentive to conversation; per interview/observation with patient  Fund of Knowledge: intact, aware of current events, vocabulary appropriate; " based on history, vocabulary, fund of knowledge, syntax, grammar, and content  Insight: questionable; based on understanding of severity of illness and HPI  Judgment: questionable; based on patient's behavior and HPI       ASSESSMENT/PLAN:   Problems Addressed/Diagnoses:  Bipolar disorder, most recent episode manic, severe, with psychotic symptoms (F31.2)  Hallucinogen (MDMA) use disorder (F16.10)    Reports h/o Schizoaffective disorder    Past Medical History:   Diagnosis Date    Bipolar disorder, unspecified     Hyperlipidemia     PCOS (polycystic ovarian syndrome)     Schizo affective schizophrenia         Plan:  Bipolar disorder, chronic with acute exacerbation  -Continue Geodon  -Continue Lithium        Expected Disposition Plan: Home        Jeffrey Burch M.D.

## 2025-06-02 NOTE — PROGRESS NOTES
Brit is a 29y/o female admitted for Bipolar disorder, most recent episode manic, severe, with psychotic symptoms (F31.2) and Hallucinogen (MDMA) use disorder (F16.10) with a uds +MDMA. CTRS met with Pt 1:1, Brit appeared manic and dysphoric AEB: tangential rambling off topics, minimizing admission, and child-like thought process with poor insight. Brit reported admission as Homicidal and suicidal thoughts and ability to perform her ADL's. CTRS educated Pt to TR group times and dates with Brit agreeing to attend and participate in TR groups. Brit reported treatment goal as Better self-esteem.       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 0   Occupation unemployed   Does the patient have dentures? No   If you were to take part in activities, which of the following would you prefer? Both   Do you feel like you have enough to keep you busy now? Yes   Do you believe that you have the opportunity for physical activity? Yes   Activity Capabilities Maximum   Subjective   Patient states Homicidal and suicidal thoughts   Assessment   Mobility ambulates independently   Transfers independently   Musculoskeletal   (none reported)   Visual Acuity normal vision   Visual Perception depth perception;color perception;recognizes letters;recognizes numbers   Hearing normal   Speech/Communication normal   Cognitive Concerns oriented x4   Emotional Concerns excessive emotional response  (appears manic and dysphoric)   Leisure Interest Survey   Leisure Interest Survey Yes   Social/Group Activities   Pentecostalism/Faith Current Interest   Physical Activities   Dancing Current Interest   Creative Activities   Singing Current Interest   Outdoor Activities   Gardening Current Interest   Other Outdoor Activity    [place]   Goals   Additional Documentation yes   Goal Formulation With patient   Time For Goal Achievement 7 days   Goal 1 Better self-esteem   Goal 1-Progress Onging- slowly progressing,   Plan   Planned Therapy Intervention Group Recreational Therapy   Expected Length of Stay 5-7days   PT Frequency Minimum of 3 visits per week

## 2025-06-02 NOTE — PROGRESS NOTES
Refused despite encouragement, Alternative materials were offered.   06/02/25 1500   [facility] Group Therapy   Group Name Therapeutic Recreation   Specific Interventions Values Clarification   Participation Level None   Participation Quality Refused;Lack of Interest

## 2025-06-02 NOTE — NURSING
AOx3. Bizarre affect. Anxious mood. Compliant with meds. Participating in groups. Interacting with select peers and staff. Talking about how she wanted to be an atheist and her family is very Confucianist, talking about Protestant and Anabaptist.

## 2025-06-02 NOTE — NURSING
AAOx3. Bizarre affect. Anxious mood. Compliant with meds. Participating in groups. In day room watching TV. Interacting with select peers and staff.

## 2025-06-02 NOTE — GROUP NOTE
Group Psychotherapy       Group Focus: Promoting Healthy Lifestyles  Group topic: Discharge Planning. Therapist explored patients need for identifying personal strengths and qualities in working towards mental health goals on an outpatient basis. Patients were given the opportunity to explore and map out their own discharge plan.          Number of patients in attendance: 9  Group Start Time: 1045  Group End Time:  1130  Groups Date: 6/2/2025  Group Topic:  Behavioral Health  Group Department: [facility]  Group Facilitators:  Sonya Olsen MSW  _____________________________________________________________________    Patient Name: Brit Baird  MRN: [id number]  Patient Class: IP- Psych   Admission Date\Time: 5/31/2025 11:14 PM  Hospital Length of Stay: 3  Primary Care Provider: Darell Fontaine NP     Referred by: Acute Psychiatry Unit Treatment Team     Target symptoms: Depression and Poor Coping Skills     Patient's response to treatment: Not Participating; Pt was not present in group session; alternative provided.      Progress toward goals: Not progressing     Plan: Continue treatment on [facility]

## 2025-06-02 NOTE — CONSULTS
Inpatient Nutrition Assessment    Admit Date: 5/31/2025   Total duration of encounter: 2 days   Patient Age: 30 y.o.    Nutrition Recommendation/Prescription     Gluten Free, Dairy Free diet  Offer Gluten free snacks such as jello, fruit, popcorn, nuts  Monitor Weight Weekly     Communication of Recommendations: reviewed with nurse and reviewed with patient    Nutrition Assessment     Malnutrition Assessment/Nutrition-Focused Physical Exam    Malnutrition Context: social/environmental circumstances (06/02/25 1107)  Malnutrition Level: other (see comments) (Does not meet criteria) (06/02/25 1107)     Weight Loss (Malnutrition): other (see comments) (Does not meet criteria) (06/02/25 1107)     Orbital Region (Subcutaneous Fat Loss): well nourished           Check Region (Muscle Loss): well nourished                       Fluid Accumulation (Malnutrition): other (see comments) (Not present) (06/02/25 1107)        A minimum of two characteristics is recommended for diagnosis of either severe or non-severe malnutrition.    Chart Review    Reason Seen: physician consult for Gluten Free diet    Malnutrition Screening Tool Results   Have you recently lost weight without trying?: No  Have you been eating poorly because of a decreased appetite?: No   MST Score: 0   Diagnosis:  SCHIZOPHRENIA AND OTHER PSYCHOTIC DISORDERS; Schizoaffective Disorder, bipolar type     Relevant Medical History: Bipolar disorder, HLD, PCOS, Schizo affective schizophrenia    Scheduled Medications:  cetirizine, 10 mg, Daily  lithium, 300 mg, BID  nicotine, 1 patch, Daily  ziprasidone, 20 mg, BID    Continuous Infusions:   PRN Medications:  acetaminophen, 650 mg, Q6H PRN  aluminum-magnesium hydroxide-simethicone, 30 mL, Q6H PRN  haloperidoL, 5 mg, Q6H PRN   And  diphenhydrAMINE, 50 mg, Q6H PRN   And  LORazepam, 2 mg, Q6H PRN   And  haloperidol lactate, 5 mg, Q6H PRN   And  diphenhydrAMINE, 50 mg, Q6H PRN   And  lorazepam, 2 mg, Q6H PRN  hydrOXYzine  "HCL, 50 mg, Q4H PRN  traZODone, 100 mg, Nightly PRN    Calorie Containing IV Medications: no significant kcals from medications at this time    Recent Labs   Lab 05/31/25  1851      K 3.4*   CALCIUM 9.5   *   CO2 22   BUN 16.0   CREATININE 0.85   EGFRNORACEVR >60   *   BILITOT 0.3   ALKPHOS 35*   ALT 33   AST 15   ALBUMIN 4.4   WBC 6.07   HGB 13.6   HCT 39.4     Nutrition Orders:  Diet Adult Regular Double Portions, Gluten Free      Appetite/Oral Intake: good/% of meals  Factors Affecting Nutritional Intake: none identified  Social Needs Impacting Access to Food: none identified  Food/Islam/Cultural Preferences: gluten free, dairy free  Food Allergies: gluten  Last Bowel Movement: 05/31/25  Wound(s):  skin intact    Comments    6/2/25 -- Consult for Gluten free diet; Pt reports receiving gluten free meals, requesting gluten free snacks -- communicated with nursing available options to meet pt's needs; pt reports good appetite; denies unintentional weight loss; K (L) - monitor for need to replete    Anthropometrics    Height: 5' 6" (167.6 cm), Height Method: Measured  Last Weight: 86.2 kg (190 lb) (05/31/25 2320), Weight Method: Standard Scale  BMI (Calculated): 30.7  BMI Classification: obese grade I (BMI 30-34.9)     Ideal Body Weight (IBW), Female: 130 lb     % Ideal Body Weight, Female (lb): 146.15 %                             Usual Weight Provided By: patient denies unintentional weight loss    Wt Readings from Last 5 Encounters:   05/31/25 86.2 kg (190 lb)   05/31/25 87.3 kg (192 lb 6.4 oz)   02/20/25 97.5 kg (214 lb 15.2 oz)   11/07/24 97.5 kg (215 lb)   05/31/23 90.8 kg (200 lb 3.2 oz)     Weight Change(s) Since Admission: new admit  Wt Readings from Last 1 Encounters:   05/31/25 2320 86.2 kg (190 lb)   Admit Weight: 86.2 kg (190 lb) (05/31/25 2320), Weight Method: Standard Scale    Estimated Needs    Weight Used For Calorie Calculations: 86 kg (189 lb 9.5 oz)  Energy Calorie " Requirements (kcal): 1892 - 2064 kcal (22 - 24 kcal/kg)  Energy Need Method: Kcal/kg  Weight Used For Protein Calculations: 86 kg (189 lb 9.5 oz)  Protein Requirements: 68 - 77 gm (0.8-0.9 gm/kg)  Fluid Requirements (mL): 1892 - 2064 ml (1ml/kcal)        Enteral Nutrition     Patient not receiving enteral nutrition at this time.    Parenteral Nutrition     Patient not receiving parenteral nutrition support at this time.    Evaluation of Received Nutrient Intake    Calories: meeting estimated needs  Protein: meeting estimated needs    Patient Education     Not applicable.    Nutrition Diagnosis     PES: No nutrition diagnosis at this time     PES:            Nutrition Interventions     Intervention(s): modified composition of meals/snacks and collaboration with other providers  Intervention(s):      Goal: Meet greater than 80% of nutritional needs by follow-up. (new)  Goal: Maintain weight throughout hospitalization. (new)    Nutrition Goals & Monitoring     Dietitian will monitor: food and beverage intake, weight, and beliefs/attitudes  Discharge planning: resume home regimen  Nutrition Risk/Follow-Up: dietitian will follow-up one time per week   Please consult if re-assessment needed sooner.

## 2025-06-03 LAB
BASOPHILS # BLD AUTO: 0.06 X10(3)/MCL
BASOPHILS NFR BLD AUTO: 1.1 %
CHOLEST SERPL-MCNC: 193 MG/DL
CHOLEST/HDLC SERPL: 6 {RATIO} (ref 0–5)
EOSINOPHIL # BLD AUTO: 0.12 X10(3)/MCL (ref 0–0.9)
EOSINOPHIL NFR BLD AUTO: 2.1 %
ERYTHROCYTE [DISTWIDTH] IN BLOOD BY AUTOMATED COUNT: 12.3 % (ref 11.5–17)
EST. AVERAGE GLUCOSE BLD GHB EST-MCNC: 96.8 MG/DL
HBA1C MFR BLD: 5 %
HCT VFR BLD AUTO: 39.7 % (ref 37–47)
HDLC SERPL-MCNC: 33 MG/DL (ref 35–60)
HGB BLD-MCNC: 13.4 G/DL (ref 12–16)
IMM GRANULOCYTES # BLD AUTO: 0.02 X10(3)/MCL (ref 0–0.04)
IMM GRANULOCYTES NFR BLD AUTO: 0.4 %
LDLC SERPL CALC-MCNC: 109 MG/DL (ref 50–140)
LITHIUM SERPL-MCNC: 0.37 MMOL/L (ref 0.5–1.2)
LYMPHOCYTES # BLD AUTO: 2.09 X10(3)/MCL (ref 0.6–4.6)
LYMPHOCYTES NFR BLD AUTO: 37.2 %
MCH RBC QN AUTO: 29.8 PG (ref 27–31)
MCHC RBC AUTO-ENTMCNC: 33.8 G/DL (ref 33–36)
MCV RBC AUTO: 88.2 FL (ref 80–94)
MONOCYTES # BLD AUTO: 0.41 X10(3)/MCL (ref 0.1–1.3)
MONOCYTES NFR BLD AUTO: 7.3 %
NEUTROPHILS # BLD AUTO: 2.92 X10(3)/MCL (ref 2.1–9.2)
NEUTROPHILS NFR BLD AUTO: 51.9 %
NRBC BLD AUTO-RTO: 0 %
PLATELET # BLD AUTO: 232 X10(3)/MCL (ref 130–400)
PMV BLD AUTO: 10.2 FL (ref 7.4–10.4)
RBC # BLD AUTO: 4.5 X10(6)/MCL (ref 4.2–5.4)
T PALLIDUM AB SER QL: NONREACTIVE
TRIGL SERPL-MCNC: 256 MG/DL (ref 37–140)
TSH SERPL-ACNC: 1.12 UIU/ML (ref 0.35–4.94)
VLDLC SERPL CALC-MCNC: 51 MG/DL
WBC # BLD AUTO: 5.62 X10(3)/MCL (ref 4.5–11.5)

## 2025-06-03 PROCEDURE — 85025 COMPLETE CBC W/AUTO DIFF WBC: CPT | Performed by: PSYCHIATRY & NEUROLOGY

## 2025-06-03 PROCEDURE — 80061 LIPID PANEL: CPT | Performed by: PSYCHIATRY & NEUROLOGY

## 2025-06-03 PROCEDURE — 86780 TREPONEMA PALLIDUM: CPT | Performed by: PSYCHIATRY & NEUROLOGY

## 2025-06-03 PROCEDURE — 25000003 PHARM REV CODE 250: Performed by: FAMILY MEDICINE

## 2025-06-03 PROCEDURE — 83036 HEMOGLOBIN GLYCOSYLATED A1C: CPT | Performed by: PSYCHIATRY & NEUROLOGY

## 2025-06-03 PROCEDURE — 25000003 PHARM REV CODE 250: Performed by: NURSE PRACTITIONER

## 2025-06-03 PROCEDURE — 11400000 HC PSYCH PRIVATE ROOM

## 2025-06-03 PROCEDURE — 25000003 PHARM REV CODE 250: Performed by: PSYCHIATRY & NEUROLOGY

## 2025-06-03 PROCEDURE — 80178 ASSAY OF LITHIUM: CPT | Performed by: NURSE PRACTITIONER

## 2025-06-03 PROCEDURE — 84443 ASSAY THYROID STIM HORMONE: CPT | Performed by: PSYCHIATRY & NEUROLOGY

## 2025-06-03 RX ADMIN — TRAZODONE HYDROCHLORIDE 100 MG: 50 TABLET ORAL at 08:06

## 2025-06-03 RX ADMIN — ZIPRASIDONE HYDROCHLORIDE 20 MG: 20 CAPSULE ORAL at 08:06

## 2025-06-03 RX ADMIN — CETIRIZINE HYDROCHLORIDE 10 MG: 10 TABLET, FILM COATED ORAL at 09:06

## 2025-06-03 RX ADMIN — LITHIUM CARBONATE 300 MG: 300 TABLET, EXTENDED RELEASE ORAL at 08:06

## 2025-06-03 RX ADMIN — LITHIUM CARBONATE 300 MG: 300 TABLET, EXTENDED RELEASE ORAL at 09:06

## 2025-06-03 RX ADMIN — ZIPRASIDONE HYDROCHLORIDE 20 MG: 20 CAPSULE ORAL at 09:06

## 2025-06-03 NOTE — PROGRESS NOTES
6/3/2025 12:50 PM   Brit Baird   1995   [id number]        Psychiatry Progress Note     Chief Complaint: Mary Alice    SUBJECTIVE:   Brit Baird is a 30 y.o. female placed under a Physician's Emergency Certificate due to suicidal ideation for the past year.    Today patient states that she is feeling alright. No overt behavioral issues reported. Her ongoing Jainism preoccupation despite the trauma experienced within her Jainism past gives concern to her overall wellbeing. She does not feel that her schizoaffective disorder is appropriate because she has never hallucinated. Patient is also asking about PTSD which I agree with given her traumatic past. Patient is requesting the Geodon be removed though I educated her on the need for mood stabilization. Will continue current treatment plan for now and will monitor for the need to augment.       UDS: (+)MDMA  Blood alcohol: <10  Lithium: <0.29     0.37 on 06/03        Current Medications:   Scheduled Meds:    cetirizine  10 mg Oral Daily    lithium  300 mg Oral BID    nicotine  1 patch Transdermal Daily    ziprasidone  20 mg Oral BID      PRN Meds:   Current Facility-Administered Medications:     acetaminophen, 650 mg, Oral, Q6H PRN    aluminum-magnesium hydroxide-simethicone, 30 mL, Oral, Q6H PRN    haloperidoL, 5 mg, Oral, Q6H PRN **AND** diphenhydrAMINE, 50 mg, Oral, Q6H PRN **AND** LORazepam, 2 mg, Oral, Q6H PRN **AND** haloperidol lactate, 5 mg, Intramuscular, Q6H PRN **AND** diphenhydrAMINE, 50 mg, Intramuscular, Q6H PRN **AND** lorazepam, 2 mg, Intramuscular, Q6H PRN    hydrOXYzine HCL, 50 mg, Oral, Q4H PRN    traZODone, 100 mg, Oral, Nightly PRN   Psychotherapeutics (From admission, onward)      Start     Stop Route Frequency Ordered    06/01/25 2100  ziprasidone capsule 20 mg         -- Oral 2 times daily 06/01/25 1610 06/01/25 2100  lithium CR tablet 300 mg         -- Oral 2 times daily 06/01/25 1704    05/31/25 2323  haloperidoL tablet 5 mg  (Med - Acute  " Behavioral Management)        Placed in "And" Linked Group    -- Oral Every 6 hours PRN 05/31/25 2323    05/31/25 2323  LORazepam tablet 2 mg  (Med - Acute  Behavioral Management)        Placed in "And" Linked Group    -- Oral Every 6 hours PRN 05/31/25 2323    05/31/25 2323  haloperidol lactate injection 5 mg  (Med - Acute  Behavioral Management)        Placed in "And" Linked Group    -- IM Every 6 hours PRN 05/31/25 2323    05/31/25 2323  LORazepam injection 2 mg  (Med - Acute  Behavioral Management)        Placed in "And" Linked Group    -- IM Every 6 hours PRN 05/31/25 2323    05/31/25 2322  traZODone tablet 100 mg         -- Oral Nightly PRN 05/31/25 2322            Allergies:   Review of patient's allergies indicates:   Allergen Reactions    Gluten protein     Aripiprazole      Other Reaction(s): respiratory distress    Ibuprofen     Lurasidone      Other Reaction(s): Not available        OBJECTIVE:   Vitals   Vitals:    06/03/25 0701   BP: 125/87   Pulse: 74   Resp: 18   Temp: 98.5 °F (36.9 °C)        Labs/Imaging/Studies:   Recent Results (from the past 36 hours)   Lipid Panel    Collection Time: 06/03/25  6:27 AM   Result Value Ref Range    Cholesterol Total 193 <=200 mg/dL    HDL Cholesterol 33 (L) 35 - 60 mg/dL    Triglyceride 256 (H) 37 - 140 mg/dL    Cholesterol/HDL Ratio 6 (H) 0 - 5    Very Low Density Lipoprotein 51     LDL Cholesterol 109.00 50.00 - 140.00 mg/dL   TSH    Collection Time: 06/03/25  6:27 AM   Result Value Ref Range    TSH 1.117 0.350 - 4.940 uIU/mL   Hemoglobin A1C    Collection Time: 06/03/25  6:27 AM   Result Value Ref Range    Hemoglobin A1c 5.0 <=7.0 %    Estimated Average Glucose 96.8 mg/dL   SYPHILIS ANTIBODY (WITH REFLEX RPR)    Collection Time: 06/03/25  6:27 AM   Result Value Ref Range    Syphilis Antibody Nonreactive Nonreactive, Equivocal   CBC with Differential    Collection Time: 06/03/25  6:27 AM   Result Value Ref Range    WBC 5.62 4.50 - 11.50 x10(3)/mcL    RBC 4.50 " "4.20 - 5.40 x10(6)/mcL    Hgb 13.4 12.0 - 16.0 g/dL    Hct 39.7 37.0 - 47.0 %    MCV 88.2 80.0 - 94.0 fL    MCH 29.8 27.0 - 31.0 pg    MCHC 33.8 33.0 - 36.0 g/dL    RDW 12.3 11.5 - 17.0 %    Platelet 232 130 - 400 x10(3)/mcL    MPV 10.2 7.4 - 10.4 fL    Neut % 51.9 %    Lymph % 37.2 %    Mono % 7.3 %    Eos % 2.1 %    Basophil % 1.1 %    Imm Grans % 0.4 %    Neut # 2.92 2.1 - 9.2 x10(3)/mcL    Lymph # 2.09 0.6 - 4.6 x10(3)/mcL    Mono # 0.41 0.1 - 1.3 x10(3)/mcL    Eos # 0.12 0 - 0.9 x10(3)/mcL    Baso # 0.06 <=0.2 x10(3)/mcL    Imm Gran # 0.02 0.00 - 0.04 x10(3)/mcL    NRBC% 0.0 %   Lithium Level    Collection Time: 06/03/25  6:27 AM   Result Value Ref Range    Lithium Level 0.37 (L) 0.50 - 1.20 mmol/L          Medical Review Of Systems:  Constitutional: negative  Respiratory: negative  Cardiovascular: negative  Gastrointestinal: negative  Genitourinary:negative  Musculoskeletal:negative  Neurological: negative      Psychiatric Mental Status Exam:  General Appearance: appears stated age, well-developed, well-nourished  Arousal: alert  Behavior: cooperative  Movements and Motor Activity: no abnormal involuntary movements noted  Orientation: oriented to person, place, time, and situation  Speech: normal rate, normal rhythm, normal volume, normal tone  Mood: "Ok"  Affect: constricted  Thought Process: linear  Associations: intact  Thought Content and Perceptions:  no suicidal ideation, no homicidal ideation, no auditory hallucinations, no visual hallucinations, no paranoid ideation, no ideas of reference  Recent and Remote Memory: recent memory intact, remote memory intact; per interview/observation with patient  Attention and Concentration: intact, attentive to conversation; per interview/observation with patient  Fund of Knowledge: intact, aware of current events, vocabulary appropriate; based on history, vocabulary, fund of knowledge, syntax, grammar, and content  Insight: questionable; based on understanding of " severity of illness and HPI  Judgment: questionable; based on patient's behavior and HPI       ASSESSMENT/PLAN:   Problems Addressed/Diagnoses:  Bipolar disorder, most recent episode manic, severe, with psychotic symptoms (F31.2)  Hallucinogen (MDMA) use disorder (F16.10)    Reports h/o Schizoaffective disorder    Past Medical History:   Diagnosis Date    Bipolar disorder, unspecified     Hyperlipidemia     PCOS (polycystic ovarian syndrome)     Schizo affective schizophrenia         Plan:  Bipolar disorder, chronic with acute exacerbation  -Continue Geodon  -Continue Lithium        Expected Disposition Plan: Home        ZANE Zuñiga-BC

## 2025-06-03 NOTE — PLAN OF CARE
Problem: Adult Behavioral Health Plan of Care  Goal: Plan of Care Review  Outcome: Progressing  Flowsheets (Taken 6/3/2025 0003)  Patient Agreement with Plan of Care: agrees  Plan of Care Reviewed With: patient  Goal: Patient-Specific Goal (Individualization)  Outcome: Progressing  Flowsheets (Taken 6/3/2025 0003)  Patient Personal Strengths: insight into illness/situation  Patient Vulnerabilities: lacks insight into illness  Goal: Adheres to Safety Considerations for Self and Others  Outcome: Progressing  Flowsheets (Taken 6/3/2025 0003)  Adheres to Safety Considerations for Self and Others: unable to achieve outcome  Intervention: Develop and Maintain Individualized Safety Plan  Flowsheets (Taken 6/3/2025 0003)  Safety Measures:   safety rounds completed   safety plan reviewed   monitored by video  Goal: Absence of New-Onset Illness or Injury  Outcome: Progressing  Intervention: Identify and Manage Fall Risk  Flowsheets (Taken 6/3/2025 0003)  Safety Measures:   safety rounds completed   safety plan reviewed   monitored by video  Intervention: Prevent Skin Injury  Flowsheets (Taken 6/3/2025 0003)  Device Skin Pressure Protection: adhesive use limited  Intervention: Prevent VTE (Venous Thromboembolism)  Flowsheets (Taken 6/3/2025 0003)  VTE Prevention/Management: fluids promoted  Intervention: Prevent Infection  Flowsheets (Taken 6/3/2025 0003)  Infection Prevention: rest/sleep promoted  Goal: Optimized Coping Skills in Response to Life Stressors  Outcome: Progressing  Flowsheets (Taken 6/3/2025 0003)  Optimized Coping Skills in Response to Life Stressors: achieves outcome  Intervention: Promote Effective Coping Strategies  Flowsheets (Taken 6/3/2025 0003)  Supportive Measures:   active listening utilized   self-care encouraged   self-reflection promoted   self-responsibility promoted  Goal: Develops/Participates in Therapeutic [place] to Support Successful Transition  Outcome: Progressing  Flowsheets (Taken  6/3/2025 0003)  Develops/Participates in Therapeutic [place] to Support Successful Transition: making progress toward outcome  Intervention: Foster Therapeutic [place]  Flowsheets (Taken 6/3/2025 0003)  Trust Relationship/Rapport: care explained  Goal: Rounds/Family Conference  Outcome: Progressing     Problem: Suicide Risk  Goal: Absence of Self-Harm  Outcome: Progressing  Intervention: Assess Risk to Self and Maintain Safety  Flowsheets (Taken 6/3/2025 0003)  Behavior Management:   behavioral plan developed   behavioral plan reviewed  Enhanced Safety Measures: monitored by video  Self-Harm Prevention: environmental self-harm risks assessed  Intervention: Promote Psychosocial Wellbeing  Flowsheets (Taken 6/3/2025 0003)  Sleep/Rest Enhancement: awakenings minimized  Supportive Measures:   active listening utilized   self-care encouraged   self-reflection promoted   self-responsibility promoted  Family/Support System Care: self-care encouraged  Intervention: Establish Safety Plan and Continuity of Care  Flowsheets (Taken 6/3/2025 0003)  Safe Transition Promotion: access to lethal means addressed     Problem: Violence Risk or Actual  Goal: Anger and Impulse Control  Outcome: Progressing  Intervention: Minimize Safety Risk  Flowsheets (Taken 6/3/2025 0003)  Behavior Management:   behavioral plan developed   behavioral plan reviewed  Sensory Stimulation Regulation: auditory stimulation minimized  De-Escalation Techniques: appropriate behavior reinforced  Enhanced Safety Measures: monitored by video  Intervention: Promote Self-Control  Flowsheets (Taken 6/3/2025 0003)  Supportive Measures:   active listening utilized   self-care encouraged   self-reflection promoted   self-responsibility promoted  Environmental Support: calm environment promoted     Problem: Psychotic Signs/Symptoms  Goal: Improved Behavioral Control (Psychotic Signs/Symptoms)  Outcome: Progressing  Intervention: Manage Behavior  Flowsheets (Taken  6/3/2025 0003)  De-Escalation Techniques: appropriate behavior reinforced  Goal: Optimal Cognitive Function (Psychotic Signs/Symptoms)  Outcome: Progressing  Flowsheets (Taken 6/3/2025 0003)  Mutually Determined Action Steps (Optimal Cognitive Function): participates in attention training  Intervention: Support and Promote Cognitive Ability  Flowsheets (Taken 6/3/2025 0003)  Trust Relationship/Rapport: care explained  Goal: Increased Participation and Engagement (Psychotic Signs/Symptoms)  Outcome: Progressing  Flowsheets (Taken 6/3/2025 0003)  Mutually Determined Action Steps (Increased Participation and Engagement): identifies symptoms triggers  Intervention: Facilitate Participation and Engagement  Flowsheets (Taken 6/3/2025 0003)  Supportive Measures:   active listening utilized   self-care encouraged   self-reflection promoted   self-responsibility promoted  Diversional Activity: television  Goal: Improved Mood Symptoms (Psychotic Signs/Symptoms)  Outcome: Progressing  Flowsheets (Taken 6/3/2025 0003)  Mutually Determined Action Steps (Improved Mood Symptoms): acknowledges progress  Intervention: Optimize Emotion and Mood  Flowsheets (Taken 6/3/2025 0003)  Supportive Measures:   active listening utilized   self-care encouraged   self-reflection promoted   self-responsibility promoted  Diversional Activity: television  Goal: Improved Psychomotor Symptoms (Psychotic Signs/Symptoms)  Outcome: Progressing  Flowsheets (Taken 6/3/2025 0003)  Mutually Determined Action Steps (Improved Psychomotor Symptoms): adheres to medication regimen  Intervention: Manage Psychomotor Movement  Flowsheets (Taken 6/3/2025 0003)  Activity (Behavioral Health): up ad radha  Diversional Activity: television  Goal: Decreased Sensory Symptoms (Psychotic Signs/Symptoms)  Outcome: Progressing  Flowsheets (Taken 6/3/2025 0003)  Mutually Determined Action Steps (Decreased Sensory Symptoms): adheres to medication regimen  Intervention: Minimize  and Manage Sensory Impairment  Flowsheets (Taken 6/3/2025 0003)  Sensory Stimulation Regulation: auditory stimulation minimized  Goal: Improved Sleep (Psychotic Signs/Symptoms)  Outcome: Progressing  Flowsheets (Taken 6/3/2025 0003)  Mutually Determined Action Steps (Improved Sleep): sleeps 4-6 hours at night  Intervention: Promote Healthy Sleep Hygiene  Flowsheets (Taken 6/3/2025 0003)  Sleep Hygiene Promotion:   awakenings minimized   regular sleep pattern promoted  Goal: Enhanced Social, Occupational or Functional Skills (Psychotic Signs/Symptoms)  Outcome: Progressing  Flowsheets (Taken 6/3/2025 0003)  Mutually Determined Action Steps (Enhanced Social, Occupational or Functional Skills): participates in social skills training  Intervention: Promote Social, Occupational and Functional Ability  Flowsheets (Taken 6/3/2025 0003)  Trust Relationship/Rapport: care explained  Social Functional Ability Promotion: autonomy promoted

## 2025-06-03 NOTE — PLAN OF CARE
06/03/25 1344   Mutuality/Individual Preferences   Patient/Family-Specific Goals (Include Timeframe) Pt will verbalize at least two triggers for depressed mood by 6.7.2025.   Patient Personal Strengths socioeconomic stability;stable living environment;community support;family/social support;independent living skills   Patient Vulnerabilities lacks insight into illness;poor impulse control

## 2025-06-03 NOTE — PROGRESS NOTES
"6/3/2025 12:50 PM   Brit Baird   1995   [id number]        Psychiatry Progress Note     Chief Complaint: Mary Alice    SUBJECTIVE:   Brit Baird is a 30 y.o. female placed under a Physician's Emergency Certificate due to suicidal ideation for the past year.    Today patient states that she is feeling alright. When asked about her medications she states that Lithium was new to her but she was tolerating this well. After the exam I rechecked her MAR and she has been on Lithium since at least December of 2024. No overt behavioral issues reported. Her ongoing [employer] preoccupation despite the trauma experienced within her [employer] past gives concern to her overall wellbeing. Will continue current treatment plan and will monitor for the need to augment.       UDS: (+)MDMA  Blood alcohol: <10  Lithium: <0.29     0.37 on 06/03        Current Medications:   Scheduled Meds:    cetirizine  10 mg Oral Daily    lithium  300 mg Oral BID    nicotine  1 patch Transdermal Daily    ziprasidone  20 mg Oral BID      PRN Meds:   Current Facility-Administered Medications:     acetaminophen, 650 mg, Oral, Q6H PRN    aluminum-magnesium hydroxide-simethicone, 30 mL, Oral, Q6H PRN    haloperidoL, 5 mg, Oral, Q6H PRN **AND** diphenhydrAMINE, 50 mg, Oral, Q6H PRN **AND** LORazepam, 2 mg, Oral, Q6H PRN **AND** haloperidol lactate, 5 mg, Intramuscular, Q6H PRN **AND** diphenhydrAMINE, 50 mg, Intramuscular, Q6H PRN **AND** lorazepam, 2 mg, Intramuscular, Q6H PRN    hydrOXYzine HCL, 50 mg, Oral, Q4H PRN    traZODone, 100 mg, Oral, Nightly PRN   Psychotherapeutics (From admission, onward)      Start     Stop Route Frequency Ordered    06/01/25 2100  ziprasidone capsule 20 mg         -- Oral 2 times daily 06/01/25 1610    06/01/25 2100  lithium CR tablet 300 mg         -- Oral 2 times daily 06/01/25 1704    05/31/25 2323  haloperidoL tablet 5 mg  (Med - Acute  Behavioral Management)        Placed in "And" Linked Group    -- Oral Every 6 hours PRN " "05/31/25 2323    05/31/25 2323  LORazepam tablet 2 mg  (Med - Acute  Behavioral Management)        Placed in "And" Linked Group    -- Oral Every 6 hours PRN 05/31/25 2323    05/31/25 2323  haloperidol lactate injection 5 mg  (Med - Acute  Behavioral Management)        Placed in "And" Linked Group    -- IM Every 6 hours PRN 05/31/25 2323    05/31/25 2323  LORazepam injection 2 mg  (Med - Acute  Behavioral Management)        Placed in "And" Linked Group    -- IM Every 6 hours PRN 05/31/25 2323    05/31/25 2322  traZODone tablet 100 mg         -- Oral Nightly PRN 05/31/25 2322            Allergies:   Review of patient's allergies indicates:   Allergen Reactions    Gluten protein     Aripiprazole      Other Reaction(s): respiratory distress    Ibuprofen     Lurasidone      Other Reaction(s): Not available        OBJECTIVE:   Vitals   Vitals:    06/03/25 0701   BP: 125/87   Pulse: 74   Resp: 18   Temp: 98.5 °F (36.9 °C)        Labs/Imaging/Studies:   Recent Results (from the past 36 hours)   Lipid Panel    Collection Time: 06/03/25  6:27 AM   Result Value Ref Range    Cholesterol Total 193 <=200 mg/dL    HDL Cholesterol 33 (L) 35 - 60 mg/dL    Triglyceride 256 (H) 37 - 140 mg/dL    Cholesterol/HDL Ratio 6 (H) 0 - 5    Very Low Density Lipoprotein 51     LDL Cholesterol 109.00 50.00 - 140.00 mg/dL   TSH    Collection Time: 06/03/25  6:27 AM   Result Value Ref Range    TSH 1.117 0.350 - 4.940 uIU/mL   Hemoglobin A1C    Collection Time: 06/03/25  6:27 AM   Result Value Ref Range    Hemoglobin A1c 5.0 <=7.0 %    Estimated Average Glucose 96.8 mg/dL   SYPHILIS ANTIBODY (WITH REFLEX RPR)    Collection Time: 06/03/25  6:27 AM   Result Value Ref Range    Syphilis Antibody Nonreactive Nonreactive, Equivocal   CBC with Differential    Collection Time: 06/03/25  6:27 AM   Result Value Ref Range    WBC 5.62 4.50 - 11.50 x10(3)/mcL    RBC 4.50 4.20 - 5.40 x10(6)/mcL    Hgb 13.4 12.0 - 16.0 g/dL    Hct 39.7 37.0 - 47.0 %    MCV 88.2 " "80.0 - 94.0 fL    MCH 29.8 27.0 - 31.0 pg    MCHC 33.8 33.0 - 36.0 g/dL    RDW 12.3 11.5 - 17.0 %    Platelet 232 130 - 400 x10(3)/mcL    MPV 10.2 7.4 - 10.4 fL    Neut % 51.9 %    Lymph % 37.2 %    Mono % 7.3 %    Eos % 2.1 %    Basophil % 1.1 %    Imm Grans % 0.4 %    Neut # 2.92 2.1 - 9.2 x10(3)/mcL    Lymph # 2.09 0.6 - 4.6 x10(3)/mcL    Mono # 0.41 0.1 - 1.3 x10(3)/mcL    Eos # 0.12 0 - 0.9 x10(3)/mcL    Baso # 0.06 <=0.2 x10(3)/mcL    Imm Gran # 0.02 0.00 - 0.04 x10(3)/mcL    NRBC% 0.0 %   Lithium Level    Collection Time: 06/03/25  6:27 AM   Result Value Ref Range    Lithium Level 0.37 (L) 0.50 - 1.20 mmol/L          Medical Review Of Systems:  Constitutional: negative  Respiratory: negative  Cardiovascular: negative  Gastrointestinal: negative  Genitourinary:negative  Musculoskeletal:negative  Neurological: negative      Psychiatric Mental Status Exam:  General Appearance: appears stated age, well-developed, well-nourished  Arousal: alert  Behavior: cooperative  Movements and Motor Activity: no abnormal involuntary movements noted  Orientation: oriented to person, place, time, and situation  Speech: normal rate, normal rhythm, normal volume, normal tone  Mood: "Ok"  Affect: constricted  Thought Process: linear  Associations: intact  Thought Content and Perceptions: (+)no suicidal ideation, no homicidal ideation, no auditory hallucinations, no visual hallucinations, no paranoid ideation, no ideas of reference  Recent and Remote Memory: recent memory intact, remote memory intact; per interview/observation with patient  Attention and Concentration: intact, attentive to conversation; per interview/observation with patient  Fund of Knowledge: intact, aware of current events, vocabulary appropriate; based on history, vocabulary, fund of knowledge, syntax, grammar, and content  Insight: questionable; based on understanding of severity of illness and HPI  Judgment: questionable; based on patient's behavior and HPI "       ASSESSMENT/PLAN:   Problems Addressed/Diagnoses:  Bipolar disorder, most recent episode manic, severe, with psychotic symptoms (F31.2)  Hallucinogen (MDMA) use disorder (F16.10)    Reports h/o Schizoaffective disorder    Past Medical History:   Diagnosis Date    Bipolar disorder, unspecified     Hyperlipidemia     PCOS (polycystic ovarian syndrome)     Schizo affective schizophrenia         Plan:  Bipolar disorder, chronic with acute exacerbation  -Continue Geodon  -Continue Lithium        Expected Disposition Plan: Home        ZANE Zuñiga-BC

## 2025-06-03 NOTE — PLAN OF CARE
Problem: Adult Behavioral Health Plan of Care  Goal: Plan of Care Review  6/3/2025 1321 by Yisel Hogan RN  Outcome: Ongoing  6/3/2025 1310 by Yisel Hogan RN  Outcome: Ongoing  Goal: Patient-Specific Goal (Individualization)  6/3/2025 1321 by Yisel Hogan RN  Outcome: Ongoing  6/3/2025 1310 by Yisel Hogan RN  Outcome: Ongoing  Goal: Adheres to Safety Considerations for Self and Others  6/3/2025 1321 by Yisel Hogan RN  Outcome: Ongoing  6/3/2025 1310 by Yisel Hogan RN  Outcome: Ongoing  Goal: Absence of New-Onset Illness or Injury  6/3/2025 1321 by Yisel Hogan RN  Outcome: Ongoing  6/3/2025 1310 by Yisel Hogan RN  Outcome: Ongoing  Goal: Optimized Coping Skills in Response to Life Stressors  6/3/2025 1321 by Yisel Hogan RN  Outcome: Ongoing  6/3/2025 1310 by Yisel Hogan RN  Outcome: Ongoing  Goal: Develops/Participates in Therapeutic [place] to Support Successful Transition  6/3/2025 1321 by Yisel Hogan RN  Outcome: Ongoing  6/3/2025 1310 by Yisel Hogan RN  Outcome: Ongoing  Goal: Rounds/Family Conference  6/3/2025 1321 by Yisel Hogan RN  Outcome: Ongoing  6/3/2025 1310 by Yisel Hogan RN  Outcome: Ongoing     Problem: Suicide Risk  Goal: Absence of Self-Harm  6/3/2025 1321 by Yisel Hogan RN  Outcome: Ongoing  6/3/2025 1310 by Yisel Hogan RN  Outcome: Ongoing     Problem: Violence Risk or Actual  Goal: Anger and Impulse Control  6/3/2025 1321 by Yisel Hogan RN  Outcome: Ongoing  6/3/2025 1310 by Yisel Hogan RN  Outcome: Ongoing     Problem: Psychotic Signs/Symptoms  Goal: Improved Behavioral Control (Psychotic Signs/Symptoms)  6/3/2025 1321 by Yisel Hoagn RN  Outcome: Ongoing  6/3/2025 1310 by Yisel Hogan RN  Outcome: Ongoing  Goal: Optimal Cognitive Function (Psychotic Signs/Symptoms)  6/3/2025 1321 by Yisel Hogan RN  Outcome: Ongoing  6/3/2025 1310 by Yisel Hogan, RN  Outcome: Ongoing  Goal: Increased Participation and Engagement (Psychotic  Signs/Symptoms)  6/3/2025 1321 by Yisel Hogan RN  Outcome: Ongoing  6/3/2025 1310 by Yisel Hogan RN  Outcome: Ongoing  Goal: Improved Mood Symptoms (Psychotic Signs/Symptoms)  6/3/2025 1321 by Yisel Hogan RN  Outcome: Ongoing  6/3/2025 1310 by Yisel Hogan RN  Outcome: Ongoing  Goal: Improved Psychomotor Symptoms (Psychotic Signs/Symptoms)  6/3/2025 1321 by Yisel Hogan RN  Outcome: Ongoing  6/3/2025 1310 by Yisel Hogan RN  Outcome: Ongoing  Goal: Decreased Sensory Symptoms (Psychotic Signs/Symptoms)  6/3/2025 1321 by Yisel Hogan RN  Outcome: Ongoing  6/3/2025 1310 by Yisel Hogan RN  Outcome: Ongoing  Goal: Improved Sleep (Psychotic Signs/Symptoms)  6/3/2025 1321 by Yisel Hogan RN  Outcome: Ongoing  6/3/2025 1310 by Yisel Hogan RN  Outcome: Ongoing  Goal: Enhanced Social, Occupational or Functional Skills (Psychotic Signs/Symptoms)  6/3/2025 1321 by Yisel Hogan RN  Outcome: Ongoing  6/3/2025 1310 by Yisel Hogan RN  Outcome: Ongoing    Pt is unkempt, she is bare foot and feet are very dirty, she denies SI/HI/AVH, intermittent eye contact, flat affect, very talkative, wants her diagnosis changed to not say Schizophrenia,

## 2025-06-03 NOTE — PROGRESS NOTES
06/03/25 1400   [facility] Group Therapy   Group Name Therapeutic Recreation   Specific Interventions Skilled Activity Creative Expression   Participation Level Active;Supportive;Appropriate;Attentive   Participation Quality Cooperative   Insight/Motivation Limited   Affect/Mood Display Elevated;Impulsive;Inappropriate   Cognition Pre-Occupied  (focused on finding a )   Psychomotor WNL
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
## 2025-06-03 NOTE — NURSING
2030 Pt requested PRN sleep med. Administered trazodone 100 mg po.    2100 Pt in bed, eyes closed, resting quietly.

## 2025-06-03 NOTE — PROGRESS NOTES
06/03/25 1000   [facility] Group Therapy   Group Name Therapeutic Recreation   Specific Interventions Skilled Activity Mild Exercises   Participation Level Active;Supportive;Appropriate;Attentive;Sharing   Participation Quality Cooperative   Insight/Motivation Limited   Affect/Mood Display Elevated;Impulsive   Cognition Pre-Occupied   Psychomotor WNL

## 2025-06-04 PROCEDURE — 11400000 HC PSYCH PRIVATE ROOM

## 2025-06-04 PROCEDURE — 25000003 PHARM REV CODE 250: Performed by: PSYCHIATRY & NEUROLOGY

## 2025-06-04 PROCEDURE — 25000003 PHARM REV CODE 250: Performed by: NURSE PRACTITIONER

## 2025-06-04 PROCEDURE — 25000003 PHARM REV CODE 250: Performed by: FAMILY MEDICINE

## 2025-06-04 RX ADMIN — CETIRIZINE HYDROCHLORIDE 10 MG: 10 TABLET, FILM COATED ORAL at 09:06

## 2025-06-04 RX ADMIN — ZIPRASIDONE HYDROCHLORIDE 20 MG: 20 CAPSULE ORAL at 08:06

## 2025-06-04 RX ADMIN — LITHIUM CARBONATE 300 MG: 300 TABLET, EXTENDED RELEASE ORAL at 08:06

## 2025-06-04 RX ADMIN — TRAZODONE HYDROCHLORIDE 100 MG: 50 TABLET ORAL at 08:06

## 2025-06-04 RX ADMIN — ZIPRASIDONE HYDROCHLORIDE 20 MG: 20 CAPSULE ORAL at 09:06

## 2025-06-04 RX ADMIN — LITHIUM CARBONATE 300 MG: 300 TABLET, EXTENDED RELEASE ORAL at 09:06

## 2025-06-04 NOTE — PROGRESS NOTES
"6/4/2025 12:50 PM   Brit Baird   1995   [id number]        Psychiatry Progress Note     Chief Complaint: "All right"    SUBJECTIVE:   Brit Baird is a 30 y.o. female placed under a Physician's Emergency Certificate due to suicidal ideation for the past year.    Staff reports patient believes that a medication will one day be developed that will solve all her problems, although she does not believe it will be developed until she is older.  Eating and sleeping appropriately.  Tolerating current treatment regimen without issues.    Reports slight improvement in suicidal ideation.  No overt behavioral issues reported by staff.  Blunted affect.  Will continue current treatment plan and will monitor for the need to augment.       UDS: (+)MDMA  Blood alcohol: <10  Lithium: <0.29       Current Medications:   Scheduled Meds:    cetirizine  10 mg Oral Daily    lithium  300 mg Oral BID    nicotine  1 patch Transdermal Daily    ziprasidone  20 mg Oral BID      PRN Meds:   Current Facility-Administered Medications:     acetaminophen, 650 mg, Oral, Q6H PRN    aluminum-magnesium hydroxide-simethicone, 30 mL, Oral, Q6H PRN    haloperidoL, 5 mg, Oral, Q6H PRN **AND** diphenhydrAMINE, 50 mg, Oral, Q6H PRN **AND** LORazepam, 2 mg, Oral, Q6H PRN **AND** haloperidol lactate, 5 mg, Intramuscular, Q6H PRN **AND** diphenhydrAMINE, 50 mg, Intramuscular, Q6H PRN **AND** lorazepam, 2 mg, Intramuscular, Q6H PRN    hydrOXYzine HCL, 50 mg, Oral, Q4H PRN    traZODone, 100 mg, Oral, Nightly PRN   Psychotherapeutics (From admission, onward)      Start     Stop Route Frequency Ordered    06/01/25 2100  ziprasidone capsule 20 mg         -- Oral 2 times daily 06/01/25 1610    06/01/25 2100  lithium CR tablet 300 mg         -- Oral 2 times daily 06/01/25 1704    05/31/25 2323  haloperidoL tablet 5 mg  (Med - Acute  Behavioral Management)        Placed in "And" Linked Group    -- Oral Every 6 hours PRN 05/31/25 2323    05/31/25 2323  LORazepam tablet " "2 mg  (Med - Acute  Behavioral Management)        Placed in "And" Linked Group    -- Oral Every 6 hours PRN 05/31/25 2323    05/31/25 2323  haloperidol lactate injection 5 mg  (Med - Acute  Behavioral Management)        Placed in "And" Linked Group    -- IM Every 6 hours PRN 05/31/25 2323    05/31/25 2323  LORazepam injection 2 mg  (Med - Acute  Behavioral Management)        Placed in "And" Linked Group    -- IM Every 6 hours PRN 05/31/25 2323    05/31/25 2322  traZODone tablet 100 mg         -- Oral Nightly PRN 05/31/25 2322            Allergies:   Review of patient's allergies indicates:   Allergen Reactions    Gluten protein     Aripiprazole      Other Reaction(s): respiratory distress    Ibuprofen     Lurasidone      Other Reaction(s): Not available        OBJECTIVE:   Vitals   Vitals:    06/04/25 0701   BP: 125/83   Pulse: 74   Resp: 17   Temp: 97.2 °F (36.2 °C)        Labs/Imaging/Studies:   Recent Results (from the past 36 hours)   Lipid Panel    Collection Time: 06/03/25  6:27 AM   Result Value Ref Range    Cholesterol Total 193 <=200 mg/dL    HDL Cholesterol 33 (L) 35 - 60 mg/dL    Triglyceride 256 (H) 37 - 140 mg/dL    Cholesterol/HDL Ratio 6 (H) 0 - 5    Very Low Density Lipoprotein 51     LDL Cholesterol 109.00 50.00 - 140.00 mg/dL   TSH    Collection Time: 06/03/25  6:27 AM   Result Value Ref Range    TSH 1.117 0.350 - 4.940 uIU/mL   Hemoglobin A1C    Collection Time: 06/03/25  6:27 AM   Result Value Ref Range    Hemoglobin A1c 5.0 <=7.0 %    Estimated Average Glucose 96.8 mg/dL   SYPHILIS ANTIBODY (WITH REFLEX RPR)    Collection Time: 06/03/25  6:27 AM   Result Value Ref Range    Syphilis Antibody Nonreactive Nonreactive, Equivocal   CBC with Differential    Collection Time: 06/03/25  6:27 AM   Result Value Ref Range    WBC 5.62 4.50 - 11.50 x10(3)/mcL    RBC 4.50 4.20 - 5.40 x10(6)/mcL    Hgb 13.4 12.0 - 16.0 g/dL    Hct 39.7 37.0 - 47.0 %    MCV 88.2 80.0 - 94.0 fL    MCH 29.8 27.0 - 31.0 pg    MCHC " "33.8 33.0 - 36.0 g/dL    RDW 12.3 11.5 - 17.0 %    Platelet 232 130 - 400 x10(3)/mcL    MPV 10.2 7.4 - 10.4 fL    Neut % 51.9 %    Lymph % 37.2 %    Mono % 7.3 %    Eos % 2.1 %    Basophil % 1.1 %    Imm Grans % 0.4 %    Neut # 2.92 2.1 - 9.2 x10(3)/mcL    Lymph # 2.09 0.6 - 4.6 x10(3)/mcL    Mono # 0.41 0.1 - 1.3 x10(3)/mcL    Eos # 0.12 0 - 0.9 x10(3)/mcL    Baso # 0.06 <=0.2 x10(3)/mcL    Imm Gran # 0.02 0.00 - 0.04 x10(3)/mcL    NRBC% 0.0 %   Lithium Level    Collection Time: 06/03/25  6:27 AM   Result Value Ref Range    Lithium Level 0.37 (L) 0.50 - 1.20 mmol/L          Medical Review Of Systems:  Constitutional: negative  Respiratory: negative  Cardiovascular: negative  Gastrointestinal: negative  Genitourinary:negative  Musculoskeletal:negative  Neurological: negative      Psychiatric Mental Status Exam:  General Appearance: appears stated age, well-developed, well-nourished  Arousal: alert  Behavior: cooperative  Movements and Motor Activity: no abnormal involuntary movements noted  Orientation: oriented to person, place, time, and situation  Speech: normal rate, normal rhythm, normal volume, normal tone  Mood: "All right"  Affect: constricted  Thought Process: linear  Associations: intact  Thought Content and Perceptions: suicidal ideation slightly improved, no homicidal ideation, no auditory hallucinations, no visual hallucinations, no paranoid ideation, no ideas of reference  Recent and Remote Memory: recent memory intact, remote memory intact; per interview/observation with patient  Attention and Concentration: intact, attentive to conversation; per interview/observation with patient  Fund of Knowledge: intact, aware of current events, vocabulary appropriate; based on history, vocabulary, fund of knowledge, syntax, grammar, and content  Insight: questionable; based on understanding of severity of illness and HPI  Judgment: questionable; based on patient's behavior and HPI       ASSESSMENT/PLAN: "   Problems Addressed/Diagnoses:  Bipolar disorder, most recent episode manic, severe, with psychotic symptoms (F31.2)  Hallucinogen (MDMA) use disorder (F16.10)    Reports h/o Schizoaffective disorder    Past Medical History:   Diagnosis Date    Bipolar disorder, unspecified     Hyperlipidemia     PCOS (polycystic ovarian syndrome)     Schizo affective schizophrenia         Plan:  Bipolar disorder, chronic with acute exacerbation  -Continue Geodon  -Continue Lithium        Expected Disposition Plan: Home        Jeffrey Burch M.D.

## 2025-06-04 NOTE — PLAN OF CARE
Problem: Adult Behavioral Health Plan of Care  Goal: Plan of Care Review  Flowsheets (Taken 6/4/2025 1141)  Patient Agreement with Plan of Care: agrees  Plan of Care Reviewed With: patient  Treatment Team  Pt seen for treatment team today with interdisciplinary team. Pt denied thoughts of wanting to harm self at this time.  Pt verbalized understanding of care plan and agreed to being discharged to home with outpatient follow-up with JESSICALA.

## 2025-06-04 NOTE — PLAN OF CARE
Brit attended interdisciplinary treatment team, was cooperative, reporting improvement, and is slowly progressing towards reported treatment goal of Better self-esteem. CTRS reported to interdisciplinary team that Brit attends and participates in TR groups, is focused on finding a , having a baby, talking about sexually inappropriate topics, and asking sexually inappropriate questions to staff and peers, is needy with attention seeking behaviors with peers and staff, and attends her  ADL's with limited insight.

## 2025-06-04 NOTE — PROGRESS NOTES
06/04/25 1400   [facility] Group Therapy   Group Name Therapeutic Recreation   Specific Interventions Skilled Activity Leisure Education and Awareness   Participation Level Active;Supportive;Appropriate;Attentive;Sharing   Participation Quality Cooperative;Social   Insight/Motivation Limited   Affect/Mood Display Elevated;Impulsive   Cognition Pre-Occupied   Psychomotor WNL

## 2025-06-04 NOTE — NURSING
Treatment Team Note:      Behavior:    Patient (Brit Baird is a 30 y.o. female, : 1995, MRN: [id number]) demonstrating an affect that was flat. Brit demonstrating mood that is depressed. Brit had an appearance that was clean. Brit denies suicidal ideation. Brit denies suicide plan. Brit denies hallucinations.    States she will be returning home with her mother on discharge.  Stated her depression and SI thoughts are not present today.    Intervention:    Encourage Brit to perform self-hygiene, grooming, and changing of clothing. Encourage Brit to attend all scheduled groups. Monitor Brit's behavior and program compliance. Monitor Brit for suicidal ideation, homicidal ideation, sleep disturbance, and hallucinations. Encourage Brit to eat all portions of meals and assess for meal preferences. Monitor Brit for intake and output to ensure hydration. Notify the Physician/Physician Assistant/Advance Practice Registered Nurse (MD/PA/APRN) for any medication refusal and any change in patient condition.    Discussed with Brit course of treatment. Discussed with Brit medications ordered and schedule of medications. Discussed collateral contact with Brit.      Response:    Brit's response to treatment team meeting was appropriate.       Plan:     Continue to monitor per MD/PA/APRN orders; maintain patient safety.

## 2025-06-04 NOTE — PLAN OF CARE
Problem: Adult Behavioral Health Plan of Care  Goal: Plan of Care Review  Outcome: Progressing  Flowsheets (Taken 6/4/2025 0646)  Patient Agreement with Plan of Care: agrees  Plan of Care Reviewed With: patient  Goal: Patient-Specific Goal (Individualization)  Outcome: Progressing  Goal: Adheres to Safety Considerations for Self and Others  Outcome: Progressing  Goal: Absence of New-Onset Illness or Injury  Outcome: Progressing  Goal: Optimized Coping Skills in Response to Life Stressors  Outcome: Progressing  Goal: Develops/Participates in Therapeutic [place] to Support Successful Transition  Outcome: Progressing  Goal: Rounds/Family Conference  Outcome: Progressing     Problem: Suicide Risk  Goal: Absence of Self-Harm  Outcome: Progressing  Intervention: Assess Risk to Self and Maintain Safety  Flowsheets (Taken 6/4/2025 0646)  Behavior Management: behavioral plan reviewed  Self-Harm Prevention: environmental self-harm risks assessed     Problem: Violence Risk or Actual  Goal: Anger and Impulse Control  Outcome: Progressing     Problem: Psychotic Signs/Symptoms  Goal: Improved Behavioral Control (Psychotic Signs/Symptoms)  Outcome: Progressing  Goal: Optimal Cognitive Function (Psychotic Signs/Symptoms)  Outcome: Progressing  Goal: Increased Participation and Engagement (Psychotic Signs/Symptoms)  Outcome: Progressing  Flowsheets (Taken 6/4/2025 0646)  Mutually Determined Action Steps (Increased Participation and Engagement): identifies symptoms triggers  Goal: Improved Mood Symptoms (Psychotic Signs/Symptoms)  Outcome: Progressing  Goal: Improved Psychomotor Symptoms (Psychotic Signs/Symptoms)  Outcome: Progressing  Goal: Decreased Sensory Symptoms (Psychotic Signs/Symptoms)  Outcome: Progressing  Goal: Improved Sleep (Psychotic Signs/Symptoms)  Outcome: Progressing  Goal: Enhanced Social, Occupational or Functional Skills (Psychotic Signs/Symptoms)  Outcome: Progressing  Flowsheets (Taken 6/4/2025  0646)  Mutually Determined Action Steps (Enhanced Social, Occupational or Functional Skills): participates in social skills training

## 2025-06-05 PROCEDURE — 25000003 PHARM REV CODE 250: Performed by: FAMILY MEDICINE

## 2025-06-05 PROCEDURE — 25000003 PHARM REV CODE 250: Performed by: PSYCHIATRY & NEUROLOGY

## 2025-06-05 PROCEDURE — 11400000 HC PSYCH PRIVATE ROOM

## 2025-06-05 PROCEDURE — 25000003 PHARM REV CODE 250: Performed by: NURSE PRACTITIONER

## 2025-06-05 PROCEDURE — 25000003 PHARM REV CODE 250

## 2025-06-05 RX ORDER — ZIPRASIDONE HYDROCHLORIDE 20 MG/1
20 CAPSULE ORAL DAILY
Status: DISCONTINUED | OUTPATIENT
Start: 2025-06-06 | End: 2025-06-06 | Stop reason: HOSPADM

## 2025-06-05 RX ORDER — ZIPRASIDONE HYDROCHLORIDE 40 MG/1
40 CAPSULE ORAL NIGHTLY
Qty: 30 CAPSULE | Refills: 0 | Status: SHIPPED | OUTPATIENT
Start: 2025-06-05 | End: 2025-07-05

## 2025-06-05 RX ORDER — ZIPRASIDONE HYDROCHLORIDE 20 MG/1
20 CAPSULE ORAL DAILY
Qty: 30 CAPSULE | Refills: 0 | Status: SHIPPED | OUTPATIENT
Start: 2025-06-06 | End: 2025-07-06

## 2025-06-05 RX ORDER — IBUPROFEN 200 MG
1 TABLET ORAL DAILY
Qty: 28 PATCH | Refills: 0 | Status: SHIPPED | OUTPATIENT
Start: 2025-06-06 | End: 2025-07-04

## 2025-06-05 RX ORDER — LITHIUM CARBONATE 300 MG/1
300 TABLET, FILM COATED, EXTENDED RELEASE ORAL 2 TIMES DAILY
Qty: 60 TABLET | Refills: 0 | Status: SHIPPED | OUTPATIENT
Start: 2025-06-05 | End: 2025-07-05

## 2025-06-05 RX ORDER — ZIPRASIDONE HYDROCHLORIDE 20 MG/1
40 CAPSULE ORAL NIGHTLY
Status: DISCONTINUED | OUTPATIENT
Start: 2025-06-05 | End: 2025-06-06 | Stop reason: HOSPADM

## 2025-06-05 RX ADMIN — ZIPRASIDONE HYDROCHLORIDE 40 MG: 20 CAPSULE ORAL at 08:06

## 2025-06-05 RX ADMIN — LITHIUM CARBONATE 300 MG: 300 TABLET, EXTENDED RELEASE ORAL at 08:06

## 2025-06-05 RX ADMIN — CETIRIZINE HYDROCHLORIDE 10 MG: 10 TABLET, FILM COATED ORAL at 08:06

## 2025-06-05 RX ADMIN — ZIPRASIDONE HYDROCHLORIDE 20 MG: 20 CAPSULE ORAL at 08:06

## 2025-06-05 RX ADMIN — TRAZODONE HYDROCHLORIDE 100 MG: 50 TABLET ORAL at 08:06

## 2025-06-05 NOTE — PROGRESS NOTES
06/05/25 1500   [facility] Group Therapy   Group Name Therapeutic Recreation   Specific Interventions Skilled Activity Creative Expression   Participation Level Active;Supportive;Appropriate;Sharing   Participation Quality Cooperative   Insight/Motivation Limited   Affect/Mood Display Appropriate   Cognition Pre-Occupied;Confused   Psychomotor WNL

## 2025-06-05 NOTE — NURSING
2020 pt requesting medication for sleep. Given 100mg Trazodone po    2050 pt in room resting comfortably NAD

## 2025-06-05 NOTE — PROGRESS NOTES
"6/5/2025 12:50 PM   Brit Baird   1995   [id number]        Psychiatry Progress Note     Chief Complaint: "All right"    SUBJECTIVE:   Brit Baird is a 30 y.o. female placed under a Physician's Emergency Certificate due to suicidal ideation for the past year.    Today patient states that she is feeling better but that she was continuing to hear voices in her head last night. She does feel her medication has been more helpful than previous medication trials but believes that an increase would help further wit the voices. I will increase the HS dose and monitor. Eating and sleeping appropriately. Tolerating current treatment regimen without issues. Denies suicidal ideation. No overt behavioral issues reported by staff. Affect improved. Will make this change and will monitor for the need to augment.       UDS: (+)MDMA  Blood alcohol: <10  Lithium: <0.29       Current Medications:   Scheduled Meds:    cetirizine  10 mg Oral Daily    lithium  300 mg Oral BID    nicotine  1 patch Transdermal Daily    ziprasidone  20 mg Oral BID      PRN Meds:   Current Facility-Administered Medications:     acetaminophen, 650 mg, Oral, Q6H PRN    aluminum-magnesium hydroxide-simethicone, 30 mL, Oral, Q6H PRN    haloperidoL, 5 mg, Oral, Q6H PRN **AND** diphenhydrAMINE, 50 mg, Oral, Q6H PRN **AND** LORazepam, 2 mg, Oral, Q6H PRN **AND** haloperidol lactate, 5 mg, Intramuscular, Q6H PRN **AND** diphenhydrAMINE, 50 mg, Intramuscular, Q6H PRN **AND** lorazepam, 2 mg, Intramuscular, Q6H PRN    hydrOXYzine HCL, 50 mg, Oral, Q4H PRN    traZODone, 100 mg, Oral, Nightly PRN   Psychotherapeutics (From admission, onward)      Start     Stop Route Frequency Ordered    06/01/25 2100  ziprasidone capsule 20 mg         -- Oral 2 times daily 06/01/25 1610    06/01/25 2100  lithium CR tablet 300 mg         -- Oral 2 times daily 06/01/25 1704    05/31/25 2323  haloperidoL tablet 5 mg  (Med - Acute  Behavioral Management)        Placed in "And" Linked " "Group    -- Oral Every 6 hours PRN 05/31/25 2323    05/31/25 2323  LORazepam tablet 2 mg  (Med - Acute  Behavioral Management)        Placed in "And" Linked Group    -- Oral Every 6 hours PRN 05/31/25 2323    05/31/25 2323  haloperidol lactate injection 5 mg  (Med - Acute  Behavioral Management)        Placed in "And" Linked Group    -- IM Every 6 hours PRN 05/31/25 2323    05/31/25 2323  LORazepam injection 2 mg  (Med - Acute  Behavioral Management)        Placed in "And" Linked Group    -- IM Every 6 hours PRN 05/31/25 2323    05/31/25 2322  traZODone tablet 100 mg         -- Oral Nightly PRN 05/31/25 2322            Allergies:   Review of patient's allergies indicates:   Allergen Reactions    Gluten protein     Aripiprazole      Other Reaction(s): respiratory distress    Ibuprofen     Lurasidone      Other Reaction(s): Not available        OBJECTIVE:   Vitals   Vitals:    06/05/25 0701   BP: 126/81   Pulse: 72   Resp: 18   Temp: 98.6 °F (37 °C)        Labs/Imaging/Studies:   No results found for this or any previous visit (from the past 36 hours).         Medical Review Of Systems:  Constitutional: negative  Respiratory: negative  Cardiovascular: negative  Gastrointestinal: negative  Genitourinary:negative  Musculoskeletal:negative  Neurological: negative      Psychiatric Mental Status Exam:  General Appearance: appears stated age, well-developed, well-nourished  Arousal: alert  Behavior: cooperative  Movements and Motor Activity: no abnormal involuntary movements noted  Orientation: oriented to person, place, time, and situation  Speech: normal rate, normal rhythm, normal volume, normal tone  Mood: "All right"  Affect: constricted  Thought Process: linear  Associations: intact  Thought Content and Perceptions: denies suicidal ideation, no homicidal ideation, (+) auditory hallucinations, no visual hallucinations, no paranoid ideation, no ideas of reference  Recent and Remote Memory: recent memory intact, remote " memory intact; per interview/observation with patient  Attention and Concentration: intact, attentive to conversation; per interview/observation with patient  Fund of Knowledge: intact, aware of current events, vocabulary appropriate; based on history, vocabulary, fund of knowledge, syntax, grammar, and content  Insight: questionable; based on understanding of severity of illness and HPI  Judgment: questionable; based on patient's behavior and HPI       ASSESSMENT/PLAN:   Problems Addressed/Diagnoses:  Bipolar disorder, most recent episode manic, severe, with psychotic symptoms (F31.2)  Hallucinogen (MDMA) use disorder (F16.10)    Reports h/o Schizoaffective disorder    Past Medical History:   Diagnosis Date    Bipolar disorder, unspecified     Hyperlipidemia     PCOS (polycystic ovarian syndrome)     Schizo affective schizophrenia         Plan:  Bipolar disorder, chronic with acute exacerbation  -Increase Geodon to 20 mg QD and 40 mg HS  -Continue Lithium        Expected Disposition Plan: Home        JOSEFINA ZuñigaP-BC

## 2025-06-05 NOTE — PLAN OF CARE
Brit met reported treatment goal of Better self-esteem.  CTRS Discharge Recommendations:  Encouraged Pt. to actively utilize available community resources to increase leisure involvement to decrease signs and symptoms of illness.  Encouraged Pt. to utilize coping skills on a regular basis to reduce the risk of decomposition and re-hospitalization.

## 2025-06-05 NOTE — PLAN OF CARE
Problem: Adult Behavioral Health Plan of Care  Goal: Rounds/Family Conference  Outcome: Progressing  Flowsheets (Taken 6/4/2025 2241)  Participants:   milieu/psych techs   nursing     Problem: Suicide Risk  Goal: Absence of Self-Harm  Outcome: Progressing  Intervention: Assess Risk to Self and Maintain Safety  Flowsheets (Taken 6/4/2025 2241)  Behavior Management: behavioral plan reviewed  Enhanced Safety Measures: monitored by video  Self-Harm Prevention: environmental self-harm risks assessed  Intervention: Promote Psychosocial Wellbeing  Flowsheets (Taken 6/4/2025 2241)  Sleep/Rest Enhancement: awakenings minimized  Supportive Measures:   active listening utilized   verbalization of feelings encouraged   self-care encouraged  Family/Support System Care: self-care encouraged  Intervention: Establish Safety Plan and Continuity of Care  Flowsheets (Taken 6/4/2025 2241)  Safe Transition Promotion: access to lethal means addressed     Problem: Psychotic Signs/Symptoms  Goal: Improved Behavioral Control (Psychotic Signs/Symptoms)  Outcome: Progressing  Flowsheets (Taken 6/4/2025 2241)  Mutually Determined Action Steps (Improved Behavioral Control): verbalizes gratifying activity  Intervention: Manage Behavior  Flowsheets (Taken 6/4/2025 2241)  De-Escalation Techniques: quiet time facilitated  Goal: Optimal Cognitive Function (Psychotic Signs/Symptoms)  Outcome: Progressing  Flowsheets (Taken 6/4/2025 2241)  Mutually Determined Action Steps (Optimal Cognitive Function): follows step-by-step instructions  Intervention: Support and Promote Cognitive Ability  Flowsheets (Taken 6/4/2025 2241)  Trust Relationship/Rapport:   care explained   questions encouraged   reassurance provided  Goal: Increased Participation and Engagement (Psychotic Signs/Symptoms)  Outcome: Progressing  Flowsheets (Taken 6/4/2025 2241)  Mutually Determined Action Steps (Increased Participation and Engagement): identifies symptoms  triggers  Intervention: Facilitate Participation and Engagement  Flowsheets (Taken 6/4/2025 2241)  Supportive Measures:   active listening utilized   verbalization of feelings encouraged   self-care encouraged  Diversional Activity: television  Goal: Improved Mood Symptoms (Psychotic Signs/Symptoms)  Outcome: Progressing  Flowsheets (Taken 6/4/2025 2241)  Mutually Determined Action Steps (Improved Mood Symptoms): acknowledges progress  Intervention: Optimize Emotion and Mood  Flowsheets (Taken 6/4/2025 2241)  Supportive Measures:   active listening utilized   verbalization of feelings encouraged   self-care encouraged  Diversional Activity: television  Goal: Improved Psychomotor Symptoms (Psychotic Signs/Symptoms)  Outcome: Progressing  Flowsheets (Taken 6/4/2025 2241)  Mutually Determined Action Steps (Improved Psychomotor Symptoms): adheres to medication regimen  Intervention: Manage Psychomotor Movement  Flowsheets (Taken 6/4/2025 2241)  Activity (Behavioral Health): up ad radha  Patient Performed Hygiene: teeth brushed  Diversional Activity: television  Goal: Decreased Sensory Symptoms (Psychotic Signs/Symptoms)  Outcome: Progressing  Flowsheets (Taken 6/4/2025 2241)  Mutually Determined Action Steps (Decreased Sensory Symptoms): adheres to medication regimen  Intervention: Minimize and Manage Sensory Impairment  Flowsheets (Taken 6/4/2025 2241)  Sensory Stimulation Regulation: quiet environment promoted  Goal: Improved Sleep (Psychotic Signs/Symptoms)  Outcome: Progressing  Flowsheets (Taken 6/4/2025 2241)  Mutually Determined Action Steps (Improved Sleep): sleeps 4-6 hours at night  Intervention: Promote Healthy Sleep Hygiene  Flowsheets (Taken 6/4/2025 2241)  Sleep Hygiene Promotion:   awakenings minimized   regular sleep pattern promoted  Goal: Enhanced Social, Occupational or Functional Skills (Psychotic Signs/Symptoms)  Outcome: Progressing  Flowsheets (Taken 6/4/2025 2241)  Mutually Determined Action Steps  (Enhanced Social, Occupational or Functional Skills): participates in social skills training  Intervention: Promote Social, Occupational and Functional Ability  Flowsheets (Taken 6/4/2025 2244)  Trust Relationship/Rapport:   care explained   questions encouraged   reassurance provided  Social Functional Ability Promotion:   autonomy promoted   self-expression encouraged   social interaction promoted     AAOx4 Pt denies HI/AVH. Pt endorses having anxiety and depression, reports lessening SI with no active plan or intent, poor sleep and good appetite. Q15 min safety checks continued.

## 2025-06-05 NOTE — PROGRESS NOTES
06/05/25 1000   [facility] Group Therapy   Group Name Therapeutic Recreation   Specific Interventions Skilled Activity Mild Exercises   Participation Level Active;Supportive;Appropriate;Attentive;Sharing   Participation Quality Cooperative;Social   Insight/Motivation Limited   Affect/Mood Display Impulsive;Hypersexual;Bizarre   Cognition Pre-Occupied   Psychomotor WNL

## 2025-06-05 NOTE — PLAN OF CARE
"  Problem: Adult Behavioral Health Plan of Care  Goal: Rounds/Family Conference  Outcome: Progressing     Problem: Suicide Risk  Goal: Absence of Self-Harm  Outcome: Progressing     Problem: Psychotic Signs/Symptoms  Goal: Improved Behavioral Control (Psychotic Signs/Symptoms)  Outcome: Progressing  Goal: Optimal Cognitive Function (Psychotic Signs/Symptoms)  Outcome: Progressing  Goal: Increased Participation and Engagement (Psychotic Signs/Symptoms)  Outcome: Progressing  Goal: Improved Mood Symptoms (Psychotic Signs/Symptoms)  Outcome: Progressing  Goal: Improved Psychomotor Symptoms (Psychotic Signs/Symptoms)  Outcome: Progressing  Goal: Decreased Sensory Symptoms (Psychotic Signs/Symptoms)  Outcome: Progressing  Goal: Improved Sleep (Psychotic Signs/Symptoms)  Outcome: Progressing  Goal: Enhanced Social, Occupational or Functional Skills (Psychotic Signs/Symptoms)  Outcome: Progressing     Pt was flirtatious with doctor saying "Hey handsome" when she walked into the treatment room. She is very talkative, wants to talk about her meds, says she still has voices in her head t night before bed. She states "it has little phrases it will say" " I have a rollodex in my head of things it says" She makes intermittent eye contact, and is excited about being discharged and wants the dosage of her medications raised.  "

## 2025-06-06 VITALS
BODY MASS INDEX: 30.53 KG/M2 | RESPIRATION RATE: 19 BRPM | TEMPERATURE: 99 F | SYSTOLIC BLOOD PRESSURE: 117 MMHG | WEIGHT: 190 LBS | HEART RATE: 84 BPM | DIASTOLIC BLOOD PRESSURE: 80 MMHG | OXYGEN SATURATION: 97 % | HEIGHT: 66 IN

## 2025-06-06 PROCEDURE — 25000003 PHARM REV CODE 250

## 2025-06-06 PROCEDURE — 25000003 PHARM REV CODE 250: Performed by: NURSE PRACTITIONER

## 2025-06-06 PROCEDURE — 25000003 PHARM REV CODE 250: Performed by: FAMILY MEDICINE

## 2025-06-06 RX ADMIN — LITHIUM CARBONATE 300 MG: 300 TABLET, EXTENDED RELEASE ORAL at 09:06

## 2025-06-06 RX ADMIN — ZIPRASIDONE HYDROCHLORIDE 20 MG: 20 CAPSULE ORAL at 09:06

## 2025-06-06 RX ADMIN — CETIRIZINE HYDROCHLORIDE 10 MG: 10 TABLET, FILM COATED ORAL at 09:06

## 2025-06-06 NOTE — PLAN OF CARE
Problem: Adult Behavioral Health Plan of Care  Goal: Rounds/Family Conference  Outcome: Met     Problem: Suicide Risk  Goal: Absence of Self-Harm  Outcome: Met     Problem: Psychotic Signs/Symptoms  Goal: Improved Behavioral Control (Psychotic Signs/Symptoms)  Outcome: Met  Goal: Optimal Cognitive Function (Psychotic Signs/Symptoms)  Outcome: Met  Goal: Increased Participation and Engagement (Psychotic Signs/Symptoms)  Outcome: Met  Goal: Improved Mood Symptoms (Psychotic Signs/Symptoms)  Outcome: Met  Goal: Improved Psychomotor Symptoms (Psychotic Signs/Symptoms)  Outcome: Met  Goal: Decreased Sensory Symptoms (Psychotic Signs/Symptoms)  Outcome: Met  Goal: Improved Sleep (Psychotic Signs/Symptoms)  Outcome: Met  Goal: Enhanced Social, Occupational or Functional Skills (Psychotic Signs/Symptoms)  Outcome: Met     AAOx4 Pt denies SI/HI. Pt reports improving anxiety and depression, reports good sleep and good appetite. Pt has good eye contact and affect is appropriate. Q15 min safety checks continued. Auditory hallucinations lessening, medication increased, planned for discharge tomorrow.

## 2025-06-06 NOTE — NURSING
At PM med pass, patient requesting medication to help sleep.    2023 Trazodone 100 mg PO given.     2053 Patient resting quietly in bed with eyes closed, NAD.

## 2025-06-06 NOTE — GROUP NOTE
Group Psychotherapy       Group Focus: Psychodynamic Group Psychotherapy    Group Topic: Mindfulness. Therapist explored patients need for increase in awareness of mindfulness, how it effects our moods and behaviors. SW discussed importance of mindfulness to decrease negative core values and thoughts, coping skills . Mindfulness activity used to encourage development of healthy coping and relaxation skills. ?    Number of patients in attendance: 6  Group Start Time: 1100  Group End Time:  1145  Groups Date: 6/5/2025  Group Topic:  Behavioral Health  Group Department: [facility]  Group Facilitators:  Ann Aguillon  _____________________________________________________________________    Patient Name: Brit Baird  MRN: [id number]  Patient Class: IP- Psych   Admission Date\Time: 5/31/2025 11:14 PM  Hospital Length of Stay: 6  Primary Care Provider: Darell Fontaine NP     Referred by: Acute Psychiatry Unit Treatment Team     Target symptoms: Substance Abuse     Patient's response to treatment: Self-disclosure     Progress toward goals: Progressing adequately     Interval History:      Diagnosis:      Plan: Continue treatment on APU

## 2025-06-06 NOTE — NURSING
Discharge Note:    Brit Baird is a 30 y.o. female, : 1995, MRN: [id number], admitted on 2025 for Jeffrey Burch MD with a diagnosis of Depression [F32.A].    Patient discharged on 2025 per physician orders in stable condition. Patient denied suicidal ideation, homicidal ideation, or hallucinations. Patient was discharged with valuables, personal belongings, prescriptions, discharge instructions, printed Warning Sings of Self-Harm pursuant to Act 737 and, an educational handout explaining the diagnosis and prescribed medications. Patient verbalized understanding of the discharge instructions and importance of follow-up visits. Patient was escorted out of the facility by [facility] and placed into a private vehicle to be transported to home.     Patient discharged on the following medications:     Medication List        START taking these medications      lithium 300 MG CR tablet  Commonly known as: LITHOBID  Take 1 tablet (300 mg total) by mouth 2 (two) times a day.  Replaces: lithium 300 MG capsule     nicotine 21 mg/24 hr  Commonly known as: NICODERM CQ  Place 1 patch onto the skin once daily.     * ziprasidone 40 MG Cap  Commonly known as: GEODON  Take 1 capsule (40 mg total) by mouth every evening.     * ziprasidone 20 MG Cap  Commonly known as: GEODON  Take 1 capsule (20 mg total) by mouth once daily.           * This list has 2 medication(s) that are the same as other medications prescribed for you. Read the directions carefully, and ask your doctor or other care provider to review them with you.                STOP taking these medications      COBENFY 50-20 mg Cap  Generic drug: xanomeline-trospium     ergocalciferol 50,000 unit Cap  Commonly known as: ERGOCALCIFEROL     fenofibrate micronized 134 MG Cap  Commonly known as: LOFIBRA     fluticasone propionate 50 mcg/actuation nasal spray  Commonly known as: FLONASE     INVEGA TRINZA 546 mg/1.75 mL Syrg injection  Generic drug:  paliperidone palm (3 month)     lithium 300 MG capsule  Commonly known as: ESKALITH  Replaced by: lithium 300 MG CR tablet     loratadine 10 mg tablet  Commonly known as: CLARITIN     metFORMIN 500 MG tablet  Commonly known as: GLUCOPHAGE     multivitamin capsule     NEXTSTELLIS 3 mg- 14.2 mg (28) Tab  Generic drug: drospirenone-estetrol     traZODone 100 MG tablet  Commonly known as: DESYREL     VITAMIN D ORAL               Where to Get Your Medications        You can get these medications from any pharmacy    Bring a paper prescription for each of these medications  lithium 300 MG CR tablet  nicotine 21 mg/24 hr  ziprasidone 20 MG Cap  ziprasidone 40 MG Cap

## 2025-06-06 NOTE — DISCHARGE SUMMARY
"DISCHARGE SUMMARY  PSYCHIATRY      Admit Date: 5/31/2025 11:14 PM    Discharge Date:  6/6/2025    SITE:   [facility]    Discharge Attending Physician: Jeffrey Burch M.D.    Chief Complaint:  "I wanted to kill myself and someone else."     History of Present Illness On Admit:   Brit Baird is a 30 y.o. female placed under a Physician's Emergency Certificate at [facility].     Brit c/o depression with SI with a plan to lay on train tracks and wait for a train to pass over her.  She reports that she has been having these feelings for months.  She also reports that she had homicidal thoughts against a antonio who she likes future girlfriend.  She says that he does not have a girlfriend now but she reports that she likes him so much that she feels that if he is with someone else, she maybe could hurt them.  She expresses that he has clearly stated to her that he does not like her that way.  .  She says that she wants to be in a relationship but he told her that he was not interested in her that way and that he had a girlfriend and he would introduce her to her.  She then spoke about just trying to rebel against her ex Scientology, Rastafarian.  She says that she just wants to live without the rules of the Judaism and not be monitored.  She says that she should be able to have sex if she wants.  She reports that she has since left Rastafarian and identifies as an Atheist but she does attend a [facility] Conf[employer]ian[employer] regularly.  She is focused a lot on the Judaism.  She also talks about her not being able to attract guys that she wants.  "I always attract fat, simp guys."  "I have been taking Cobenfy and thought that I would lose weight with it, but I have not; I am only losing about 1/2 pound a week."  "I want kids, but if satan wins, I won't have kids."   She says that she is tired of other people telling her what to do.  She says that she " "always takes into consideration what others say/want.  She then shifted her conversation to, "I don't care if Mexicans get sent back or not; but they probably are taking funding from me."  She then alluded to the antonio that she wanted once dated a Nigerian, and that he said that Mexicans are more family oriented than white people.  She says that she does not have friends since leaving the Yazdanism.  She says that there is no socialization in [place].  She says that she tries dating apps.  "I like being on the internet and talking to strangers who are not in the Yazdanism."  She admits to having angels and demons present in her head.  She says that they tell her good (angels) and bad (demons) things.  She reports also having a mental image of them.  She says, I know they are not real, but they are constantly there.  She says that she has been taking her Cobenfy and Lithium daily.      Admit Mental Status Exam:  General Appearance: dressed in casual attire, obese  Arousal: alert  Behavior: cooperative, appropriate eye-contact, under good behavioral control  Movements and Motor Activity: no abnormal involuntary movements noted; no tics, no tremors, no akathisia, no dystonia, no evidence of tardive dyskinesia; no psychomotor agitation or retardation  Orientation: oriented to person, place, time, and situation  Speech: normal rate, normal rhythm, conversational, loud  Mood: Depressed and Elevated  Affect: expansive, labile  Thought Process: bizarre  Associations: intact  Thought Content and Perceptions: + suicidal ideation, +plan by laying on a track and having a train run over her; +futuristic HI towards if her friend gets a girlfriend - the HI is towards this person in the future.  Recent and Remote Memory: grossly intact; per interview/observation with patient  Attention and Concentration: grossly intact; per interview/observation with patient  Fund of Knowledge: grossly intact; based on history, vocabulary, fund of " "knowledge, syntax, grammar, and content  Insight: limited/partial awareness of illness; based on understanding of severity of illness and HPI  Judgment: poor; based on patient's behavior and HPI      Diagnoses:  PRINCIPAL PROBLEM:  Bipolar I disorder, most recent episode manic, severe with psychotic features      PROBLEM LIST    Bipolar I disorder, most recent episode manic, severe with psychotic features    MDMA abuse        Hospital Course:   06/02  Patient was previously here in 2023.  This morning, she speaks about her parents Gnosticist beliefs and her recent changes in her "religiosity."  Had been following up at [facility].  Will continue to do so on discharge.  Cooperative with evaluation.  Will continue current treatment plan and will monitor for the need to augment.     06/03  Today patient states that she is feeling alright. No overt behavioral issues reported. Her ongoing Yazidism preoccupation despite the trauma experienced within her Yazidism past gives concern to her overall wellbeing. She does not feel that her schizoaffective disorder is appropriate because she has never hallucinated. Patient is also asking about PTSD which I agree with given her traumatic past. Patient is requesting the Geodon be removed though I educated her on the need for mood stabilization. Will continue current treatment plan for now and will monitor for the need to augment.     06/04  Staff reports patient believes that a medication will one day be developed that will solve all her problems, although she does not believe it will be developed until she is older.  Eating and sleeping appropriately.  Tolerating current treatment regimen without issues.     Reports slight improvement in suicidal ideation.  No overt behavioral issues reported by staff.  Blunted affect.  Will continue current treatment plan and will monitor for the need to augment.    06/05  Today patient states that she is feeling better but that she was continuing to " "hear voices in her head last night. She does feel her medication has been more helpful than previous medication trials but believes that an increase would help further wit the voices. I will increase the HS dose and monitor. Eating and sleeping appropriately. Tolerating current treatment regimen without issues. Denies suicidal ideation. No overt behavioral issues reported by staff. Affect improved. Will make this change and will monitor for the need to augment.     06/06  Today patient states that she is feeling better but that she was continuing to hear voices in her head last night. She does feel her medication has been more helpful than previous medication trials but believes that an increase would help further wit the voices. I will increase the HS dose and monitor. Eating and sleeping appropriately. Tolerating current treatment regimen without issues. Denies suicidal ideation. No overt behavioral issues reported by staff. Affect improved. Will make this change and will monitor for the need to augment.         Current Medications:   Scheduled Meds:    cetirizine  10 mg Oral Daily    lithium  300 mg Oral BID    nicotine  1 patch Transdermal Daily    ziprasidone  20 mg Oral Daily    ziprasidone  40 mg Oral QHS          DISCHARGE EXAMINATION    VITALS   Vitals:    06/03/25 1100 06/03/25 1600 06/04/25 0701 06/05/25 0701   BP: 131/87 127/80 125/83 126/81   Pulse: 87 81 74 72   Resp: 18 18 17 18   Temp: 98.7 °F (37.1 °C) 97.7 °F (36.5 °C) 97.2 °F (36.2 °C) 98.6 °F (37 °C)   TempSrc:       SpO2: 96% 98% 97% 97%   Weight:       Height:             Discharge Mental Status Exam:  General Appearance: appears stated age, well-developed, well-nourished  Arousal: alert  Behavior: cooperative  Movements and Motor Activity: no abnormal involuntary movements noted  Orientation: oriented to person, place, time, and situation  Speech: normal rate, normal rhythm, normal volume, normal tone  Mood: "All right"  Affect: " constricted  Thought Process: linear  Associations: intact  Thought Content and Perceptions: denies suicidal ideation, no homicidal ideation, (+) auditory hallucinations, no visual hallucinations, no paranoid ideation, no ideas of reference  Recent and Remote Memory: recent memory intact, remote memory intact; per interview/observation with patient  Attention and Concentration: intact, attentive to conversation; per interview/observation with patient  Fund of Knowledge: intact, aware of current events, vocabulary appropriate; based on history, vocabulary, fund of knowledge, syntax, grammar, and content  Insight: questionable; based on understanding of severity of illness and HPI  Judgment: questionable; based on patient's behavior and HPI       Discharge Condition:  Stable    Prognosis:  Fair    Justification for multiple antipsychotics:  N/a    Disposition:  discharged to home    Follow-up:     Follow-up Information       [facility] - Follow up.    Why: therapy via virtual: 6.24.2025 @2pm  psych:6.11.2025 @2pm  Contact information:  [address] [place] [id number] [phone]                             Medication Regimen:  Scheduled Meds:    cetirizine  10 mg Oral Daily    lithium  300 mg Oral BID    nicotine  1 patch Transdermal Daily    ziprasidone  20 mg Oral Daily    ziprasidone  40 mg Oral QHS          Patient Instructions:   Continue medication regimen as prescribed.    Disposition plan per  - see  notes for details.    Patient instructed to call 911 or present to emergency department if any of the following complications develop status post discharge: suicidality, homicidality, or grave disability.     Total time spent discharging patient: <30 minutes      ZANE Zuñiga

## 2025-07-01 NOTE — PROGRESS NOTES
"Subjective:    Patient ID: Brit Baird is a 30 y.o. female who presented to [facility] for new visit.    Chief Complaint: Pain of the Right Knee, Left Leg    History of Present Illness:  Brit Baird presents to the clinic today for intermittent left leg numbness/tingling while running and right anterior knee pain for the past few months. She notes that over the last few months while running she has intermittent left leg numbness/tingling. She denies a particular distribution of her symptoms noting that it is circumferential and starts mid thigh radiating down to her toes. She denies the leg giving out or causing her to fall and the sensation only happens while running. She also has had right anterior knee pain and denies any trauma. Typically her symptoms are during exercise. Patient has had no prior knee problems. Evaluation to date: plain films, PCP evaluation, and EMG. Treatment to date: avoidance of activity, oral analgesics, and PT (recently started PT). Expectations for today's visit:  Further evaluation. PCP: Darell Fontaine NP.    Knee Review of Systems:  Swelling?  no  Instability?  no  Mechanical sx?  no  <30 min AM stiffness? no  Limited ROM? no  Fever/Chills? no    Objective:     Physical Exam:  BP 97/72   Pulse 88   Ht 5' 6" (1.676 m)   Wt 82.8 kg (182 lb 8 oz)   BMI 29.46 kg/m²     Appearance:  Normal gait/station - FWB  Alignment: Left: normal Right: normal   Soft tissue swelling: Left: no Right: no  Effusion: Left:  Negative Right: Negative  Erythema: Left no Right: no  Ecchymosis: Left: no Right: no  Atrophy: Left: yes Right: yes - bilateral quad    Palpation:  Knee Tenderness: Left: None Right: None    Range of motion:  Flexion (140): Left:  140 Right: 140  Extension (0): Left: 0 Right: 0    Strength:  Extension: Left 5/5  Pain: no     Right 5/5 Pain: no  Flexion: Left 5/5 Pain: no Right   5/5 Pain: no    Special Tests:  Ballotable " Effusion:Left: Negative Right: Negative   Fluid Wave: Left: Negative Right: Negative   Crepitus: Left: Negative Right: Negative   Patellar grind test: Left: Negative  Right: Negative  Apprehension test: Left: Negative Right: Negative   Varus: @ 0, Left Negative Right: Negative.  @ 30, Left Negative  Right Negative   Valgus: @ 0, Left Negative Right: Negative.  @ 30, Left Negative  Right Negative  Lachman: Left: Negative Right: Negative   Ant Drawer: Left: Negative Right: Negative   Posterior Drawer: Left: Negative Right: Negative   Dial Test: Left: Not performed Right: Not performed   Lindsay: Left: Negative Right: Negative   Apley's: Left: Not performed Right: Not performed  Thessaly's: Left: Not performed Right: Not performed   Noble Compression: Left: Not performed Right: Not performed   Kam: Left: Not performed Right: Not performed   J-sign: Left: Positive  Right: Positive     General appearance: NAD  Peripheral pulses: normal bilaterally   Reflexes: Left: normal Right normal   Sensation: normal    Labs:  Last A1c: 5     Imaging:   Previous images reviewed.  X-rays ordered and performed today: yes  # of views: 4 Laterality: bilateral  My Interpretation: no acute abnormalities noted, no fractures or dislocations; minimal joint space narrowing to the medial compartment, patella pavel noted bilaterally per IS ratio    Assessment:     Encounter Diagnoses   Code Name Primary?    M22.2X1, M22.2X2 Patellofemoral syndrome of both knees Yes    R20.0, R20.2 Numbness and tingling of left leg        Plan:      MDM: Prior external referring provider notes reviewed. Prior external referring provider studies reviewed.   Dx: Patellofemoral Syndrome, Left Leg numbness/tingling -  New problem  Treatment Plan: Discussed with patient diagnosis and treatment recommendations. Recommend conservative treatment to include: avoidance of aggravating activity, significant modification of daily activities, hot/cold therapies, topical and  oral medications, braces, HEP/PT/OT, and injections. Discussed with patient patellofemoral syndrome and the importance of continued formal PT. Also discussed compression bracing. As for the patients numbness/tingling to left leg, will request EMG results that patient has completed previously to further evaluate and determine next steps of workup. Will have the patient follow up in 3 months.   Imaging: radiological studies ordered and independently reviewed; discussed with patient; pending radiologist interpretation.   Weight Management: is paramount. Recommend at least 10 pounds weight loss if your BMI is 25-29.9. A BMI of <24.9 may provide further relief.  Procedure: Discussed injections as treatment options; patient not a candidate for injections at this time.  Activity: Activity as tolerated; HEP to include aerobic conditioning and strength training with non-painful activity. ROM/STG exercises. Proper footware; assistive devises to avoid limping.   Therapy: Physical Therapy  Medication: START Voltaren Gel 1% as prescribed to affected area. Please see your primary care physician for further refills.  RTC: 3 months.       This note is dictated using the M*Modal Fluency Direct word recognition program. There are word recognition mistakes that are occasionally missed on review.     Caron Munoz MD  Sports Medicine Fellow

## 2025-07-02 ENCOUNTER — HOSPITAL ENCOUNTER (OUTPATIENT)
Dept: RADIOLOGY | Facility: HOSPITAL | Age: 30
Discharge: HOME | End: 2025-07-02
Payer: MEDICAID

## 2025-07-02 ENCOUNTER — OFFICE VISIT (OUTPATIENT)
Dept: ORTHOPEDICS | Facility: CLINIC | Age: 30
End: 2025-07-02
Payer: MEDICAID

## 2025-07-02 VITALS
BODY MASS INDEX: 29.33 KG/M2 | SYSTOLIC BLOOD PRESSURE: 97 MMHG | HEART RATE: 88 BPM | HEIGHT: 66 IN | DIASTOLIC BLOOD PRESSURE: 72 MMHG | WEIGHT: 182.5 LBS

## 2025-07-02 DIAGNOSIS — R20.0 NUMBNESS AND TINGLING OF LEFT LEG: ICD-10-CM

## 2025-07-02 DIAGNOSIS — M22.2X2 PATELLOFEMORAL SYNDROME OF BOTH KNEES: Primary | ICD-10-CM

## 2025-07-02 DIAGNOSIS — M25.562 PAIN IN BOTH KNEES, UNSPECIFIED CHRONICITY: ICD-10-CM

## 2025-07-02 DIAGNOSIS — M22.2X1 PATELLOFEMORAL SYNDROME OF BOTH KNEES: Primary | ICD-10-CM

## 2025-07-02 DIAGNOSIS — M25.561 PAIN IN BOTH KNEES, UNSPECIFIED CHRONICITY: ICD-10-CM

## 2025-07-02 DIAGNOSIS — R20.2 NUMBNESS AND TINGLING OF LEFT LEG: ICD-10-CM

## 2025-07-02 PROCEDURE — 73564 X-RAY EXAM KNEE 4 OR MORE: CPT | Mod: TC,RT

## 2025-07-02 PROCEDURE — 73564 X-RAY EXAM KNEE 4 OR MORE: CPT | Mod: TC,LT

## 2025-07-02 PROCEDURE — 99213 OFFICE O/P EST LOW 20 MIN: CPT | Mod: PBBFAC,25

## 2025-07-02 RX ORDER — LAMOTRIGINE 100 MG/1
100 TABLET ORAL
COMMUNITY
Start: 2025-06-11

## 2025-07-02 RX ORDER — PALIPERIDONE PALMITATE 234 MG/1.5ML
INJECTION INTRAMUSCULAR
COMMUNITY
Start: 2025-01-15

## 2025-07-02 RX ORDER — DROSPIRENONE AND ESTETROL 3-14.2(28)
1 KIT ORAL
COMMUNITY
Start: 2025-06-10

## 2025-07-03 NOTE — PROGRESS NOTES
Faculty Attestation: Brit Baird  was seen in [facility] Medicine Clinic Discussed with Dr. Munoz at the time of the visit. History of Present Illness, Physical Exam, and Assessment and Plan reviewed.     Treatment plan is reasonable and appropriate. Compliance with treatment recommendations is important.      Radiology images independently reviewed and agree with fellow interpretation.     No procedure was performed.    Ajay Alaniz MD  Sports Medicine

## 2025-08-21 ENCOUNTER — OFFICE VISIT (OUTPATIENT)
Dept: OBSTETRICS AND GYNECOLOGY | Facility: CLINIC | Age: 30
End: 2025-08-21
Payer: MEDICAID

## 2025-08-21 VITALS
BODY MASS INDEX: 28.74 KG/M2 | SYSTOLIC BLOOD PRESSURE: 106 MMHG | HEIGHT: 66 IN | WEIGHT: 178.81 LBS | DIASTOLIC BLOOD PRESSURE: 68 MMHG

## 2025-08-21 DIAGNOSIS — Z30.017 ENCOUNTER FOR INITIAL PRESCRIPTION OF IMPLANTABLE SUBDERMAL CONTRACEPTIVE: Primary | ICD-10-CM

## 2025-08-21 LAB
B-HCG UR QL: NEGATIVE
CTP QC/QA: YES

## 2025-08-21 PROCEDURE — 3074F SYST BP LT 130 MM HG: CPT | Mod: CPTII,,,

## 2025-08-21 PROCEDURE — 99213 OFFICE O/P EST LOW 20 MIN: CPT | Mod: S$PBB,,,

## 2025-08-21 PROCEDURE — 99999 PR PBB SHADOW E&M-EST. PATIENT-LVL III: CPT | Mod: PBBFAC,,,

## 2025-08-21 PROCEDURE — 3078F DIAST BP <80 MM HG: CPT | Mod: CPTII,,,

## 2025-08-21 PROCEDURE — 99999PBSHW POCT URINE PREGNANCY: Mod: PBBFAC,,,

## 2025-08-21 PROCEDURE — 81025 URINE PREGNANCY TEST: CPT | Mod: PBBFAC

## 2025-08-21 PROCEDURE — 99213 OFFICE O/P EST LOW 20 MIN: CPT | Mod: PBBFAC

## 2025-08-21 PROCEDURE — 1159F MED LIST DOCD IN RCRD: CPT | Mod: CPTII,,,

## 2025-08-21 PROCEDURE — 3008F BODY MASS INDEX DOCD: CPT | Mod: CPTII,,,

## 2025-08-21 PROCEDURE — 3044F HG A1C LEVEL LT 7.0%: CPT | Mod: CPTII,,,

## 2025-08-21 PROCEDURE — 1160F RVW MEDS BY RX/DR IN RCRD: CPT | Mod: CPTII,,,
